# Patient Record
Sex: FEMALE | Race: WHITE | Employment: FULL TIME | URBAN - METROPOLITAN AREA
[De-identification: names, ages, dates, MRNs, and addresses within clinical notes are randomized per-mention and may not be internally consistent; named-entity substitution may affect disease eponyms.]

---

## 2017-02-22 ENCOUNTER — OFFICE VISIT (OUTPATIENT)
Dept: DERMATOLOGY | Facility: CLINIC | Age: 32
End: 2017-02-22
Payer: COMMERCIAL

## 2017-02-22 VITALS — DIASTOLIC BLOOD PRESSURE: 55 MMHG | OXYGEN SATURATION: 98 % | SYSTOLIC BLOOD PRESSURE: 110 MMHG | HEART RATE: 78 BPM

## 2017-02-22 DIAGNOSIS — L40.8 OTHER PSORIASIS: ICD-10-CM

## 2017-02-22 DIAGNOSIS — L40.9 PSORIASIS: Primary | ICD-10-CM

## 2017-02-22 PROCEDURE — 99244 OFF/OP CNSLTJ NEW/EST MOD 40: CPT | Performed by: DERMATOLOGY

## 2017-02-22 RX ORDER — CLOBETASOL PROPIONATE 0.5 MG/G
CREAM TOPICAL
Qty: 120 G | Refills: 3 | Status: SHIPPED | OUTPATIENT
Start: 2017-02-22 | End: 2020-04-07

## 2017-02-22 RX ORDER — CALCIPOTRIENE 0.05 MG/ML
SOLUTION TOPICAL
Qty: 60 ML | Refills: 3 | Status: SHIPPED | OUTPATIENT
Start: 2017-02-22 | End: 2017-12-07

## 2017-02-22 RX ORDER — CALCIPOTRIENE 50 UG/G
CREAM TOPICAL AT BEDTIME
Qty: 100 G | Refills: 3 | Status: SHIPPED | OUTPATIENT
Start: 2017-02-22 | End: 2019-12-03

## 2017-02-22 NOTE — MR AVS SNAPSHOT
"              After Visit Summary   2/22/2017    Rebeca Sow    MRN: [id number]           Patient Information     Date Of Birth          1985        Visit Information        Provider Department      2/22/2017 1:00 PM Bo Lima MD [facility]        Today's Diagnoses     Psoriasis    -  1    Other psoriasis           Follow-ups after your visit        Your next 10 appointments already scheduled     Mar 07, 2017  2:00 PM CST   PHYSICAL with Mario Saucedo MD   [facility] ([facility])    [address] [place] [address] [phone]              Who to contact     If you have questions or need follow up information about today's clinic visit or your schedule please contact [facility] directly at [phone].  Normal or non-critical lab and imaging results will be communicated to you by MyChart, letter or phone within 4 business days after the clinic has received the results. If you do not hear from us within 7 days, please contact the clinic through MyChart or phone. If you have a critical or abnormal lab result, we will notify you by phone as soon as possible.  Submit refill requests through [employer] or call your pharmacy and they will forward the refill request to us. Please allow 3 business days for your refill to be completed.          Additional Information About Your Visit        MyChart Information     [employer] lets you send messages to your doctor, view your test results, renew your prescriptions, schedule appointments and more. To sign up, go to [email or web address] . Click on \"Log in\" on the left side of the screen, which will take you to the Welcome page. Then click on \"Sign up Now\" on the right side of the page.     You will be asked to enter the access code listed below, as well as some personal information. Please follow the directions to create your username and password.     Your access code is: " [email or web address]  Expires: 2017  1:18 PM     Your access code will  in 90 days. If you need help or a new code, please call your [place] clinic or [phone].        Care EveryWhere ID     This is your Care EveryWhere ID. This could be used by other organizations to access your [place] medical records  [id number]        Your Vitals Were     Pulse Pulse Oximetry                78 98%           Blood Pressure from Last 3 Encounters:   17 110/55   16 111/82   14 126/88    Weight from Last 3 Encounters:   16 65 kg (143 lb 6.4 oz)   14 71.2 kg (157 lb)   10/22/13 74.4 kg (164 lb)              Today, you had the following     No orders found for display         Today's Medication Changes          These changes are accurate as of: 17  1:21 PM.  If you have any questions, ask your nurse or doctor.               Start taking these medicines.        Dose/Directions    clobetasol 0.05 % cream   Commonly known as:  TEMOVATE   Used for:  Psoriasis, Other psoriasis        Apply sparingly to affected area daily as needed.  Do not apply to face.   Quantity:  120 g   Refills:  3         These medicines have changed or have updated prescriptions.        Dose/Directions    * DOVONEX 0.005 % Soln solution   This may have changed:  Another medication with the same name was added. Make sure you understand how and when to take each.   Used for:  Other psoriasis   Generic drug:  calcipotriene        apply prn   Quantity:  1 large bottle   Refills:  1 year       * calcipotriene 0.005 % cream   Commonly known as:  DOVONOX   This may have changed:  You were already taking a medication with the same name, and this prescription was added. Make sure you understand how and when to take each.   Used for:  Psoriasis, Other psoriasis        Apply topically At Bedtime   Quantity:  100 g   Refills:  3       * calcipotriene 0.005 % Soln solution   Commonly known as:  DOVONOX   This may have changed:  You  were already taking a medication with the same name, and this prescription was added. Make sure you understand how and when to take each.   Used for:  Psoriasis, Other psoriasis        Apply to scalp at bedtime   Quantity:  60 mL   Refills:  3       * Notice:  This list has 3 medication(s) that are the same as other medications prescribed for you. Read the directions carefully, and ask your doctor or other care provider to review them with you.         Where to get your medicines      These medications were sent to [facility] [address] [address]     Phone:  [phone]     calcipotriene 0.005 % cream    calcipotriene 0.005 % Soln solution    clobetasol 0.05 % cream                Primary Care Provider Office Phone # Fax #    Harper Howard [phone]       [facility] [address] [address]        Thank you!     Thank you for choosing [facility]  for your care. Our goal is always to provide you with excellent care. Hearing back from our patients is one way we can continue to improve our services. Please take a few minutes to complete the written survey that you may receive in the mail after your visit with us. Thank you!             Your Updated Medication List - Protect others around you: Learn how to safely use, store and throw away your medicines at www.disposemymeds.org.          This list is accurate as of: 2/22/17  1:21 PM.  Always use your most recent med list.                   Brand Name Dispense Instructions for use    betamethasone (augmented) 0.05 % lotion    DIPROLENE    60 mL    Apply topically daily       clobetasol 0.05 % cream    TEMOVATE    120 g    Apply sparingly to affected area daily as needed.  Do not apply to face.       * DOVONEX 0.005 % Soln solution   Generic drug:  calcipotriene     1 large bottle    apply prn       * calcipotriene 0.005 % cream     DOVONOX    100 g    Apply topically At Bedtime       * calcipotriene 0.005 % Soln solution    DOVONOX    60 mL    Apply to scalp at bedtime       * Notice:  This list has 3 medication(s) that are the same as other medications prescribed for you. Read the directions carefully, and ask your doctor or other care provider to review them with you.

## 2017-02-22 NOTE — NURSING NOTE
"Initial /55  Pulse 78  SpO2 98% Estimated body mass index is 27.54 kg/(m^2) as calculated from the following:    Height as of 3/31/16: 1.537 m (5' 0.5\").    Weight as of 3/31/16: 65 kg (143 lb 6.4 oz). .      "

## 2017-03-07 ENCOUNTER — OFFICE VISIT (OUTPATIENT)
Dept: FAMILY MEDICINE | Facility: CLINIC | Age: 32
End: 2017-03-07
Payer: COMMERCIAL

## 2017-03-07 VITALS
SYSTOLIC BLOOD PRESSURE: 114 MMHG | HEIGHT: 61 IN | TEMPERATURE: 97.2 F | HEART RATE: 85 BPM | OXYGEN SATURATION: 98 % | BODY MASS INDEX: 30.85 KG/M2 | WEIGHT: 163.4 LBS | DIASTOLIC BLOOD PRESSURE: 68 MMHG

## 2017-03-07 DIAGNOSIS — D06.9 SEVERE DYSPLASIA OF CERVIX (CIN III): ICD-10-CM

## 2017-03-07 DIAGNOSIS — Z01.419 WELL WOMAN EXAM WITH ROUTINE GYNECOLOGICAL EXAM: Primary | ICD-10-CM

## 2017-03-07 DIAGNOSIS — Z23 ENCOUNTER FOR IMMUNIZATION: ICD-10-CM

## 2017-03-07 LAB
CHOLEST SERPL-MCNC: 193 MG/DL
GLUCOSE SERPL-MCNC: 64 MG/DL (ref 70–99)
HDLC SERPL-MCNC: 52 MG/DL
LDLC SERPL CALC-MCNC: 123 MG/DL
NONHDLC SERPL-MCNC: 141 MG/DL
TRIGL SERPL-MCNC: 91 MG/DL

## 2017-03-07 PROCEDURE — 90471 IMMUNIZATION ADMIN: CPT | Performed by: FAMILY MEDICINE

## 2017-03-07 PROCEDURE — 80061 LIPID PANEL: CPT | Performed by: FAMILY MEDICINE

## 2017-03-07 PROCEDURE — 82947 ASSAY GLUCOSE BLOOD QUANT: CPT | Performed by: FAMILY MEDICINE

## 2017-03-07 PROCEDURE — 87624 HPV HI-RISK TYP POOLED RSLT: CPT | Performed by: FAMILY MEDICINE

## 2017-03-07 PROCEDURE — 99395 PREV VISIT EST AGE 18-39: CPT | Performed by: FAMILY MEDICINE

## 2017-03-07 PROCEDURE — 36415 COLL VENOUS BLD VENIPUNCTURE: CPT | Performed by: FAMILY MEDICINE

## 2017-03-07 PROCEDURE — 90715 TDAP VACCINE 7 YRS/> IM: CPT | Performed by: FAMILY MEDICINE

## 2017-03-07 PROCEDURE — G0145 SCR C/V CYTO,THINLAYER,RESCR: HCPCS | Performed by: FAMILY MEDICINE

## 2017-03-07 NOTE — PROGRESS NOTES
SUBJECTIVE:     CC: Rebeca Sow is an 32 year old woman who presents for preventive health visit.     Healthy Habits:    Do you get at least three servings of calcium containing foods daily (dairy, green leafy vegetables, etc.)? yes    Amount of exercise or daily activities, outside of work: 1 day per week    Problems taking medications regularly No    Medication side effects: No    Have you had an eye exam in the past two years? yes    Do you see a dentist twice per year? No, once per year    Do you have sleep apnea, excessive snoring or daytime drowsiness?no        PROBLEMS TO ADD ON...none    Today's PHQ-2 Score:   PHQ-2 ( 1999 [employer]) 3/7/2017 3/31/2016   Q1: Little interest or pleasure in doing things 0 0   Q2: Feeling down, depressed or hopeless 0 0   PHQ-2 Score 0 0       Abuse: Current or Past(Physical, Sexual or Emotional)- No  Do you feel safe in your environment - Yes    Social History   Substance Use Topics     Smoking status: Never Smoker     Smokeless tobacco: Not on file     Alcohol use Yes      Comment: occ.     The patient does not drink >3 drinks per day nor >7 drinks per week.    No results for input(s): CHOL, HDL, LDL, TRIG, CHOLHDLRATIO, NHDL in the last 12622 hours.    Reviewed orders with patient.  Reviewed health maintenance and updated orders accordingly - Yes    Mammo Decision Support:  Mammogram not appropriate for this patient based on age.    Pertinent mammograms are reviewed under the imaging tab.  History of abnormal Pap smear: YES - BHARATI 2/3 on biopsy in 2009 with LEEP- PAP/HPV co-testing at 12, 24 months.  If two negative results repeat co-testing in 3 years, if negative then routine screening.  Normal in 2012    Reviewed and updated as needed this visit by clinical staff  Tobacco  Allergies  Med Hx  Surg Hx  Fam Hx  Soc Hx        Reviewed and updated as needed this visit by Provider  Allergies  Meds  Problems         ROS:  C: NEGATIVE for fever, chills, change in  "weight  I: NEGATIVE for worrisome rashes, moles or lesions  E: NEGATIVE for vision changes or irritation  ENT: NEGATIVE for ear, mouth and throat problems  R: NEGATIVE for significant cough or SOB  B: NEGATIVE for masses, tenderness or discharge  CV: NEGATIVE for chest pain, palpitations or peripheral edema  GI: NEGATIVE for nausea, abdominal pain, heartburn, or change in bowel habits  : NEGATIVE for unusual urinary or vaginal symptoms. Periods are regular.  M: NEGATIVE for significant arthralgias or myalgia  N: NEGATIVE for weakness, dizziness or paresthesias  P: NEGATIVE for changes in mood or affect    Problem list, Medication list, Allergies, and Medical/Social/Surgical histories reviewed in [facility] and updated as appropriate.  OBJECTIVE:     /68  Pulse 85  Temp 97.2  F (36.2  C) (Tympanic)  Ht 5' 0.5\" (1.537 m)  Wt 163 lb 6.4 oz (74.1 kg)  LMP 02/17/2017  SpO2 98%  BMI 31.39 kg/m2  EXAM:  GENERAL: healthy, alert and no distress  EYES: Eyes grossly normal to inspection, PERRL and conjunctivae and sclerae normal  HENT: ear canals and TM's normal, nose and mouth without ulcers or lesions  NECK: no adenopathy, no asymmetry, masses, or scars and thyroid normal to palpation  RESP: lungs clear to auscultation - no rales, rhonchi or wheezes  BREAST: normal without masses, tenderness or nipple discharge and no palpable axillary masses or adenopathy  CV: regular rate and rhythm, normal S1 S2, no S3 or S4, no murmur, click or rub, no peripheral edema and peripheral pulses strong  ABDOMEN: soft, nontender, no hepatosplenomegaly, no masses and bowel sounds normal   (female): normal female external genitalia, normal urethral meatus, vaginal mucosa pink, moist, well rugated, and normal cervix/adnexa/uterus without masses or discharge  MS: no gross musculoskeletal defects noted, no edema  SKIN: lower and upper legs pink colored psoriasis lesions, no suspicious lesions or rashes  NEURO: Normal strength and tone, " "mentation intact and speech normal  PSYCH: mentation appears normal, affect normal/bright    ASSESSMENT/PLAN:     Rebeca was seen today for physical and imm/inj.    Diagnoses and all orders for this visit:    Well woman exam with routine gynecological exam  -to lab for cholesterol and glucose    Severe dysplasia of cervix (BHARATI III): in 2009 with LEEP  -     Pap imaged thin layer screen with HPV - recommended age 30 - 65 years (select HPV order below)        COUNSELING:   Reviewed preventive health counseling, as reflected in patient instructions       Regular exercise       Healthy diet/nutrition       Vision screening         reports that she has never smoked. She does not have any smokeless tobacco history on file.    Estimated body mass index is 31.39 kg/(m^2) as calculated from the following:    Height as of this encounter: 5' 0.5\" (1.537 m).    Weight as of this encounter: 163 lb 6.4 oz (74.1 kg).   Weight management plan: Discussed healthy diet and exercise guidelines and patient will follow up in 12 months in clinic to re-evaluate.    Counseling Resources:  ATP IV Guidelines  Pooled Cohorts Equation Calculator  Breast Cancer Risk Calculator  FRAX Risk Assessment  ICSI Preventive Guidelines  Dietary Guidelines for Americans, 2010  USDA's MyPlate  ASA Prophylaxis  Lung CA Screening    Mario Saucedo MD  [facility]  "

## 2017-03-07 NOTE — NURSING NOTE
"Chief Complaint   Patient presents with     Physical     NOT fasting       Initial /68  Pulse 85  Temp 97.2  F (36.2  C) (Tympanic)  Ht 5' 0.5\" (1.537 m)  Wt 163 lb 6.4 oz (74.1 kg)  LMP 03/02/2017 (Exact Date)  SpO2 98%  BMI 31.39 kg/m2 Estimated body mass index is 31.39 kg/(m^2) as calculated from the following:    Height as of this encounter: 5' 0.5\" (1.537 m).    Weight as of this encounter: 163 lb 6.4 oz (74.1 kg).  Medication Reconciliation: complete  "

## 2017-03-07 NOTE — MR AVS SNAPSHOT
After Visit Summary   3/7/2017    Rebeca Sow    MRN: [id number]           Patient Information     Date Of Birth          1985        Visit Information        Provider Department      3/7/2017 2:00 PM Mario Saucedo MD [facility]        Today's Diagnoses     Well woman exam with routine gynecological exam    -  1    Severe dysplasia of cervix (BHARATI III)          Care Instructions    To lab for blood work    Start prenatal when start trying for folic acid and iron        Preventive Health Recommendations  Female Ages 26 - 39  Yearly exam:   See your health care provider every year in order to    Review health changes.     Discuss preventive care.      Review your medicines if you your doctor has prescribed any.    Until age 30: Get a Pap test every three years (more often if you have had an abnormal result).    After age 30: Talk to your doctor about whether you should have a Pap test every 3 years or have a Pap test with HPV screening every 5 years.   You do not need a Pap test if your uterus was removed (hysterectomy) and you have not had cancer.  You should be tested each year for STDs (sexually transmitted diseases), if you're at risk.   Talk to your provider about how often to have your cholesterol checked.  If you are at risk for diabetes, you should have a diabetes test (fasting glucose).  Shots: Get a flu shot each year. Get a tetanus shot every 10 years.   Nutrition:     Eat at least 5 servings of fruits and vegetables each day.    Eat whole-grain bread, whole-wheat pasta and brown rice instead of white grains and rice.    Talk to your provider about Calcium and Vitamin D.     Lifestyle    Exercise at least 150 minutes a week (30 minutes a day, 5 days of the week). This will help you control your weight and prevent disease.    Limit alcohol to one drink per day.    No smoking.     Wear sunscreen to prevent skin cancer.    See your dentist every six months for an exam  "and cleaning.            Thank you for choosing [facility].  You may be receiving a survey in the mail from Mimi Webb regarding your visit today.  Please take a few minutes to complete and return the survey to let us know how we are doing.      If you have questions or concerns, please contact us via "[employer]" or you can contact your care team at [phone].    Our Clinic hours are:  Monday 6:40 am  to 7:00 pm  Tuesday -Friday 6:40 am to 5:00 pm    The [place] outpatient lab hours are:  Monday - Friday 6:10 am to 4:45 pm  Saturdays 7:00 am to 11:00 am  Appointments are required, call [phone]    If you have clinical questions after hours or would like to schedule an appointment,  call the clinic at [phone].        Follow-ups after your visit        Who to contact     If you have questions or need follow up information about today's clinic visit or your schedule please contact [facility] directly at [phone].  Normal or non-critical lab and imaging results will be communicated to you by CIRQYhart, letter or phone within 4 business days after the clinic has received the results. If you do not hear from us within 7 days, please contact the clinic through [employer] or phone. If you have a critical or abnormal lab result, we will notify you by phone as soon as possible.  Submit refill requests through "[employer]" or call your pharmacy and they will forward the refill request to us. Please allow 3 business days for your refill to be completed.          Additional Information About Your Visit        [email or web address] Information     "[employer]" lets you send messages to your doctor, view your test results, renew your prescriptions, schedule appointments and more. To sign up, go to [email or web address]" . Click on \"Log in\" on the left side of the screen, which will take you to the Welcome page. Then click on \"Sign up Now\" on the right side of the page.     You will be asked to enter the access code listed " "below, as well as some personal information. Please follow the directions to create your username and password.     Your access code is: [email or web address]  Expires: 2017  1:18 PM     Your access code will  in 90 days. If you need help or a new code, please call your [facility] or [phone].        Care EveryWhere ID     This is your Care EveryWhere ID. This could be used by other organizations to access your [place] medical records  [id number]        Your Vitals Were     Pulse Temperature Height Last Period Pulse Oximetry BMI (Body Mass Index)    85 97.2  F (36.2  C) (Tympanic) 5' 0.5\" (1.537 m) 2017 98% 31.39 kg/m2       Blood Pressure from Last 3 Encounters:   17 114/68   17 110/55   16 111/82    Weight from Last 3 Encounters:   17 163 lb 6.4 oz (74.1 kg)   16 143 lb 6.4 oz (65 kg)   14 157 lb (71.2 kg)              We Performed the Following     Glucose     Lipid Profile with reflex to direct LDL     Pap imaged thin layer screen with HPV - recommended age 30 - 65 years (select HPV order below)        Primary Care Provider Office Phone # Fax #    Harper Howard [phone]       [facility] [address] [address]        Thank you!     Thank you for choosing [facility]  for your care. Our goal is always to provide you with excellent care. Hearing back from our patients is one way we can continue to improve our services. Please take a few minutes to complete the written survey that you may receive in the mail after your visit with us. Thank you!             Your Updated Medication List - Protect others around you: Learn how to safely use, store and throw away your medicines at www.disposemymeds.org.          This list is accurate as of: 3/7/17  2:36 PM.  Always use your most recent med list.                   Brand Name Dispense Instructions for use    betamethasone (augmented) 0.05 % lotion    " DIPROLENE    60 mL    Apply topically daily       clobetasol 0.05 % cream    TEMOVATE    120 g    Apply sparingly to affected area daily as needed.  Do not apply to face.       * DOVONEX 0.005 % Soln solution   Generic drug:  calcipotriene     1 large bottle    apply prn       * calcipotriene 0.005 % cream    DOVONOX    100 g    Apply topically At Bedtime       * calcipotriene 0.005 % Soln solution    DOVONOX    60 mL    Apply to scalp at bedtime       * Notice:  This list has 3 medication(s) that are the same as other medications prescribed for you. Read the directions carefully, and ask your doctor or other care provider to review them with you.

## 2017-03-07 NOTE — LETTER
[facility]  [address] [address]  [phone]      March 15, 2017    Rebeca Sow  [address] [place] [id number]    Dear Rebeca,  We are happy to inform you that your PAP smear result from 3/7/17 is normal.  We are now able to do a follow up test on PAP smears. The DNA test is for HPV (Human Papilloma Virus). Cervical cancer is closely linked with certain types of HPV. Your result showed no evidence of high risk HPV.  Therefore we recommend you return in 1 year for your next pap smear and HPV test.  You will still need to return to the clinic every year for an annual exam and other preventive tests.  Please contact the clinic with any questions.  Sincerely,  Mario Saucedo MD/jacob

## 2017-03-07 NOTE — PATIENT INSTRUCTIONS
To lab for blood work    Start prenatal when start trying for folic acid and iron        Preventive Health Recommendations  Female Ages 26 - 39  Yearly exam:   See your health care provider every year in order to    Review health changes.     Discuss preventive care.      Review your medicines if you your doctor has prescribed any.    Until age 30: Get a Pap test every three years (more often if you have had an abnormal result).    After age 30: Talk to your doctor about whether you should have a Pap test every 3 years or have a Pap test with HPV screening every 5 years.   You do not need a Pap test if your uterus was removed (hysterectomy) and you have not had cancer.  You should be tested each year for STDs (sexually transmitted diseases), if you're at risk.   Talk to your provider about how often to have your cholesterol checked.  If you are at risk for diabetes, you should have a diabetes test (fasting glucose).  Shots: Get a flu shot each year. Get a tetanus shot every 10 years.   Nutrition:     Eat at least 5 servings of fruits and vegetables each day.    Eat whole-grain bread, whole-wheat pasta and brown rice instead of white grains and rice.    Talk to your provider about Calcium and Vitamin D.     Lifestyle    Exercise at least 150 minutes a week (30 minutes a day, 5 days of the week). This will help you control your weight and prevent disease.    Limit alcohol to one drink per day.    No smoking.     Wear sunscreen to prevent skin cancer.    See your dentist every six months for an exam and cleaning.            Thank you for choosing [facility].  You may be receiving a survey in the mail from [employer] regarding your visit today.  Please take a few minutes to complete and return the survey to let us know how we are doing.      If you have questions or concerns, please contact us via [employer]ite or you can contact your care team at [phone].    Our Clinic hours are:  Monday 6:40 am  to 7:00  pm  Tuesday -Friday 6:40 am to 5:00 pm    The [place] outpatient lab hours are:  Monday - Friday 6:10 am to 4:45 pm  Saturdays 7:00 am to 11:00 am  Appointments are required, call [phone]    If you have clinical questions after hours or would like to schedule an appointment,  call the clinic at [phone].

## 2017-03-09 LAB
COPATH REPORT: NORMAL
PAP: NORMAL

## 2017-03-13 LAB
FINAL DIAGNOSIS: NORMAL
HPV HR 12 DNA CVX QL NAA+PROBE: NEGATIVE
HPV16 DNA SPEC QL NAA+PROBE: NEGATIVE
HPV18 DNA SPEC QL NAA+PROBE: NEGATIVE
SPECIMEN DESCRIPTION: NORMAL

## 2017-04-04 ENCOUNTER — OFFICE VISIT (OUTPATIENT)
Dept: FAMILY MEDICINE | Facility: CLINIC | Age: 32
End: 2017-04-04
Payer: COMMERCIAL

## 2017-04-04 VITALS
HEIGHT: 61 IN | HEART RATE: 109 BPM | SYSTOLIC BLOOD PRESSURE: 122 MMHG | WEIGHT: 166.4 LBS | DIASTOLIC BLOOD PRESSURE: 83 MMHG | TEMPERATURE: 99.7 F | BODY MASS INDEX: 31.42 KG/M2 | OXYGEN SATURATION: 95 %

## 2017-04-04 DIAGNOSIS — G43.011 INTRACTABLE MIGRAINE WITHOUT AURA AND WITH STATUS MIGRAINOSUS: Primary | ICD-10-CM

## 2017-04-04 PROCEDURE — 99213 OFFICE O/P EST LOW 20 MIN: CPT | Performed by: INTERNAL MEDICINE

## 2017-04-04 RX ORDER — RIZATRIPTAN BENZOATE 5 MG/1
5-10 TABLET ORAL
Qty: 18 TABLET | Refills: 3 | Status: SHIPPED | OUTPATIENT
Start: 2017-04-04 | End: 2019-11-18

## 2017-04-04 NOTE — MR AVS SNAPSHOT
"              After Visit Summary   4/4/2017    Rebcea Sow    MRN: [id number]           Patient Information     Date Of Birth          1985        Visit Information        Provider Department      4/4/2017 3:20 PM Darin Drake MD [facility]        Today's Diagnoses     Intractable migraine without aura and with status migrainosus    -  1      Care Instructions    Your headache has mixed features- it's not entirely clear what type of headache it is.  It does have some features of migraine, so we will try a migraine medicine called rizatriptan.  If headache is still not improved after a week, please let us know.  If you develop worsening neck pain, fevers, or confusion, please go to the ER.          Follow-ups after your visit        Who to contact     If you have questions or need follow up information about today's clinic visit or your schedule please contact [facility] directly at [phone].  Normal or non-critical lab and imaging results will be communicated to you by MyChart, letter or phone within 4 business days after the clinic has received the results. If you do not hear from us within 7 days, please contact the clinic through [employer] or phone. If you have a critical or abnormal lab result, we will notify you by phone as soon as possible.  Submit refill requests through [employer] or call your pharmacy and they will forward the refill request to us. Please allow 3 business days for your refill to be completed.          Additional Information About Your Visit        MyChart Information     [employer] lets you send messages to your doctor, view your test results, renew your prescriptions, schedule appointments and more. To sign up, go to [email or web address] . Click on \"Log in\" on the left side of the screen, which will take you to the Welcome page. Then click on \"Sign up Now\" on the right side of the page.     You will be asked to enter the access code listed below, as " "well as some personal information. Please follow the directions to create your username and password.     Your access code is: [email or web address]  Expires: 2017  2:18 PM     Your access code will  in 90 days. If you need help or a new code, please call your [facility] clinic or [phone].        Care EveryWhere ID     This is your Care EveryWhere ID. This could be used by other organizations to access your [facility] medical records  [id number]        Your Vitals Were     Pulse Temperature Height Last Period Pulse Oximetry BMI (Body Mass Index)    109 99.7  F (37.6  C) (Tympanic) 5' 0.5\" (1.537 m) 2017 95% 31.96 kg/m2       Blood Pressure from Last 3 Encounters:   17 122/83   17 114/68   17 110/55    Weight from Last 3 Encounters:   17 166 lb 6.4 oz (75.5 kg)   17 163 lb 6.4 oz (74.1 kg)   16 143 lb 6.4 oz (65 kg)              Today, you had the following     No orders found for display         Today's Medication Changes          These changes are accurate as of: 17  4:04 PM.  If you have any questions, ask your nurse or doctor.               Start taking these medicines.        Dose/Directions    rizatriptan 5 MG tablet   Commonly known as:  MAXALT   Used for:  Intractable migraine without aura and with status migrainosus   Started by:  Darin Drake MD        Dose:  5-10 mg   Take 1-2 tablets (5-10 mg) by mouth at onset of headache for migraine May repeat in 2 hours. Max 6 tablets/24 hours.   Quantity:  18 tablet   Refills:  3            Where to get your medicines      These medications were sent to [facility] [place], [place] - [address]  [address] [address]     Phone:  [phone]     rizatriptan 5 MG tablet                Primary Care Provider Office Phone # Fax #    Harper Howard [phone]       [facility] [address] [address]        Thank you!     Thank you for " choosing [facility]  for your care. Our goal is always to provide you with excellent care. Hearing back from our patients is one way we can continue to improve our services. Please take a few minutes to complete the written survey that you may receive in the mail after your visit with us. Thank you!             Your Updated Medication List - Protect others around you: Learn how to safely use, store and throw away your medicines at www.disposemymeds.org.          This list is accurate as of: 4/4/17  4:04 PM.  Always use your most recent med list.                   Brand Name Dispense Instructions for use    betamethasone (augmented) 0.05 % lotion    DIPROLENE    60 mL    Apply topically daily       clobetasol 0.05 % cream    TEMOVATE    120 g    Apply sparingly to affected area daily as needed.  Do not apply to face.       * DOVONEX 0.005 % Soln solution   Generic drug:  calcipotriene     1 large bottle    apply prn       * calcipotriene 0.005 % cream    DOVONOX    100 g    Apply topically At Bedtime       * calcipotriene 0.005 % Soln solution    DOVONOX    60 mL    Apply to scalp at bedtime       rizatriptan 5 MG tablet    MAXALT    18 tablet    Take 1-2 tablets (5-10 mg) by mouth at onset of headache for migraine May repeat in 2 hours. Max 6 tablets/24 hours.       * Notice:  This list has 3 medication(s) that are the same as other medications prescribed for you. Read the directions carefully, and ask your doctor or other care provider to review them with you.

## 2017-04-04 NOTE — PATIENT INSTRUCTIONS
Your headache has mixed features- it's not entirely clear what type of headache it is.  It does have some features of migraine, so we will try a migraine medicine called rizatriptan.  If headache is still not improved after a week, please let us know.  If you develop worsening neck pain, fevers, or confusion, please go to the ER.

## 2017-04-04 NOTE — PROGRESS NOTES
SUBJECTIVE:                                                    Rebeca Sow is a 32 year old female who presents to clinic today for the following health issues:  Chief Complaint   Patient presents with     Headache     x 6 days, w/ some dizziness, no history of migraine      Headache     Onset: x 6 days     Description:   Location: bilateral in the frontal area, and the back of the head/neck    Character: squeezing pain, throbbing pain, more noticeable when standing   Frequency:  All day/ every day - constant since Thursday.   Duration:  All day     Intensity: moderate    Progression of Symptoms:  improving and Saturday was the worse day w symptoms and patient reports felt hot/cold and felt like she was going to vomit    Patient had indirect exposure to meningitis (coworker's son).      Accompanying Signs & Symptoms:  Stiff neck: YES  Neck or upper back pain: YES, some neck pain but this is mild  Fever: YES- slightly hot/cold at home   Sinus pressure: no   Nausea or vomiting: YES- nausea, no vomiting   Dizziness: YES- while standing  Numbness: no   Weakness: no   Visual changes: YES- a little of blurred vision, not able to focus w/ computer work.  Last vision check was a couple of months ago.  Patient reports she wears glasses and they broke a couple of months ago and has not filled prescription yet.   Ears feel a bit plugged, no pain   History:   Head trauma: no   Family history of migraines: yes, mother gets migraine, but knows her triggers.   Previous tests for headaches: no   Neurologist evaluations: no   Able to do daily activities: no, did not go to work today.   Wake with a headaches: no, no headache for about 15 - 20 minutes in the morning, but once awake and up, headache comes right back.   Do headaches wake you up: no   Daily pain medication use: no   Work/school stressors/changes: no     Precipitating factors:   Does light make it worse: YES  Does sound make it worse: YES, phones ringing, papers  "rustling     Alleviating factors:  Does sleep help: YES         Therapies Tried and outcome: Ibuprofen (Advil, Motrin) on Sat. And Sunday- somewhat helpful. 12 hour sinus medication and Flonase     Rebeca does not have any prior history of headache, but 5 days ago started to develop a headache at work that gradually worsened over the next couple of days.  Associated symptoms as above.     Problem list and histories reviewed & adjusted, as indicated.  Additional history: as documented    Current Outpatient Prescriptions   Medication Sig Dispense Refill     rizatriptan (MAXALT) 5 MG tablet Take 1-2 tablets (5-10 mg) by mouth at onset of headache for migraine May repeat in 2 hours. Max 6 tablets/24 hours. 18 tablet 3     clobetasol (TEMOVATE) 0.05 % cream Apply sparingly to affected area daily as needed.  Do not apply to face. 120 g 3     calcipotriene (DOVONOX) 0.005 % cream Apply topically At Bedtime 100 g 3     calcipotriene (DOVONOX) 0.005 % SOLN solution Apply to scalp at bedtime 60 mL 3     betamethasone, augmented, (DIPROLENE) 0.05 % lotion Apply topically daily 60 mL 6     DOVONEX 0.005 % EX SOLN apply prn 1 large bottle 1 year     No Known Allergies    Reviewed and updated as needed this visit by clinical staff  Tobacco  Allergies  Med Hx  Surg Hx  Fam Hx  Soc Hx      Reviewed and updated as needed this visit by Provider         ROS:  Constitutional, HEENT, neuro systems are negative, except as otherwise noted.    OBJECTIVE:                                                    /83 (BP Location: Right arm, Patient Position: Chair, Cuff Size: Adult Regular)  Pulse 109  Temp 99.7  F (37.6  C) (Tympanic)  Ht 5' 0.5\" (1.537 m)  Wt 166 lb 6.4 oz (75.5 kg)  LMP 02/17/2017  SpO2 95%  BMI 31.96 kg/m2  Body mass index is 31.96 kg/(m^2).    GENERAL: healthy, alert and no distress  RESP: lungs clear to auscultation - no rales, rhonchi or wheezes  CV: regular rate and rhythm, normal S1 S2, no S3 or S4, no " murmur, click or rub, normal temporal pulses  MSK: Mild neck pain at base of skull bilaterally, pain not significantly increased with neck flexion  NEURO: Cranial nerves intact, normal strength and tone, sensory exam grossly normal, mentation intact, DTR's normal and symmetric at patellas, gait normal including heel/toe/tandem walking and Romberg normal, normal finger to nose and heel to shin tests, negative Alok-Hallpike      Diagnostic Test Results:  none      ASSESSMENT/PLAN:                                                        1. Headache- mixed features between migraine and tension    Rebeca has some features of both tension headache and migraine.  Her coworker's son had meningitis recently, but her neck pain is mild and she does not appear sick, so I think likelihood of meningitis is quite low- but advised her to present to ER if she develops worsening neck pain, fevers, confusion.  She wonders about sinus headache, but has not had sinus pain, so I think this is unlikely.  She can continue Flonase to see if that is helpful if she desires.  She has not used a lot of ibuprofen, so I do not suspect she has rebound headache.  Neurologic exam is normal, so I did not feel there was indication for imaging at this point.  We will try some rizatriptan since she does have some migraine features to see if that is helpful.  Follow-up in a week if not improved.      - rizatriptan (MAXALT) 5 MG tablet; Take 1-2 tablets (5-10 mg) by mouth at onset of headache for migraine May repeat in 2 hours. Max 6 tablets/24 hours.  Dispense: 18 tablet; Refill: 3      Darin Drake MD  [facility]

## 2017-04-04 NOTE — NURSING NOTE
"Chief Complaint   Patient presents with     Headache     x 6 days, w/ some dizziness, no history of migraine        Initial /83 (BP Location: Right arm, Patient Position: Chair, Cuff Size: Adult Regular)  Pulse 109  Temp 99.7  F (37.6  C) (Tympanic)  Ht 5' 0.5\" (1.537 m)  Wt 166 lb 6.4 oz (75.5 kg)  LMP 02/17/2017  SpO2 95%  BMI 31.96 kg/m2 Estimated body mass index is 31.96 kg/(m^2) as calculated from the following:    Height as of this encounter: 5' 0.5\" (1.537 m).    Weight as of this encounter: 166 lb 6.4 oz (75.5 kg).  Medication Reconciliation: complete   Nata MATHIS CMA (AAMA)    "

## 2017-05-11 PROBLEM — G43.011 INTRACTABLE MIGRAINE WITHOUT AURA AND WITH STATUS MIGRAINOSUS: Status: ACTIVE | Noted: 2017-05-11

## 2017-12-07 ENCOUNTER — TELEPHONE (OUTPATIENT)
Dept: FAMILY MEDICINE | Facility: CLINIC | Age: 32
End: 2017-12-07

## 2017-12-07 ENCOUNTER — OFFICE VISIT (OUTPATIENT)
Dept: FAMILY MEDICINE | Facility: CLINIC | Age: 32
End: 2017-12-07
Payer: COMMERCIAL

## 2017-12-07 VITALS
HEIGHT: 61 IN | SYSTOLIC BLOOD PRESSURE: 123 MMHG | HEART RATE: 82 BPM | BODY MASS INDEX: 32.81 KG/M2 | OXYGEN SATURATION: 98 % | DIASTOLIC BLOOD PRESSURE: 81 MMHG | TEMPERATURE: 97.6 F | WEIGHT: 173.8 LBS | RESPIRATION RATE: 16 BRPM

## 2017-12-07 DIAGNOSIS — L40.9 PSORIASIS: ICD-10-CM

## 2017-12-07 DIAGNOSIS — S39.012A BACK STRAIN, INITIAL ENCOUNTER: Primary | ICD-10-CM

## 2017-12-07 PROCEDURE — 99214 OFFICE O/P EST MOD 30 MIN: CPT | Performed by: NURSE PRACTITIONER

## 2017-12-07 RX ORDER — CALCIPOTRIENE 0.05 MG/ML
SOLUTION TOPICAL
Qty: 60 ML | Refills: 3 | Status: SHIPPED | OUTPATIENT
Start: 2017-12-07 | End: 2018-10-17

## 2017-12-07 RX ORDER — CYCLOBENZAPRINE HCL 10 MG
5-10 TABLET ORAL
Qty: 30 TABLET | Refills: 1 | Status: SHIPPED | OUTPATIENT
Start: 2017-12-07 | End: 2018-10-17

## 2017-12-07 RX ORDER — CALCIPOTRIENE 0.05 MG/ML
SOLUTION TOPICAL
Qty: 60 ML | Refills: 3 | Status: SHIPPED | OUTPATIENT
Start: 2017-12-07 | End: 2017-12-07

## 2017-12-07 RX ORDER — CALCIPOTRIENE 50 UG/G
OINTMENT TOPICAL
Qty: 120 G | Refills: 3 | Status: SHIPPED | OUTPATIENT
Start: 2017-12-07 | End: 2019-05-22

## 2017-12-07 NOTE — PATIENT INSTRUCTIONS
Back Sprain or Strain    Injury to the muscles (strain) or ligaments (sprain) around the spine can be troubling. Injury may occur after a sudden forceful twisting or bending force such as in a car accident, after a simple awkward movement, or after lifting something heavy with poor body positioning. In any case, muscle spasm is often present and adds to the pain.  Thankfully, most people feel better in 1 to 2 weeks, and most of the rest in 1 to 2 months. Most people can remain active. Unless you had a forceful or traumatic physical injury such as a car accident or fall, X-rays may not be ordered for the first evaluation of a back sprain or strain. If pain continues and does not respond to medical treatment, your healthcare provider may then order X-rays and other tests.  Home care  The following guidelines will help you care for your injury at home:    When in bed, try to find a comfortable position. A firm mattress is best. Try lying flat on your back with pillows under your knees. You can also try lying on your side with your knees bent up toward your chest and a pillow between your knees.    Don't sit for long periods. Try not to take long car rides or take other trips that have you sitting for a long time. This puts more stress on the lower back than standing or walking.    During the first 24 to 72 hours after an injury or flare-up, apply an ice pack to the painful area for 20 minutes. Then remove it for 20 minutes. Do this for 60 to 90 minutes, or several times a day. This will reduce swelling and pain. Be sure to wrap the ice pack in a thin towel or plastic to protect your skin.    You can start with ice, then switch to heat. Heat from a hot shower, hot bath, or heating pad reduces pain and works well for muscle spasms. Put heat on the painful area for 20 minutes, then remove for 20 minutes. Do this for 60 to 90 minutes, or several times a day. Do not use a heating pad while sleeping. It can burn the  skin.    You can alternate the ice and heat. Talk with your healthcare provider to find out the best treatment or therapy for your back pain.    Therapeutic massage will help relax the back muscles without stretching them.    Be aware of safe lifting methods. Do not lift anything over 15 pounds until all of the pain is gone.  Medicines  Talk to your healthcare provider before using medicines, especially if you have other health problems or are taking other medicines.    You may use acetaminophen or ibuprofen to control pain, unless another pain medicine was prescribed. If you have chronic conditions like diabetes, liver or kidney disease, stomach ulcers, or gastrointestinal bleeding, or are taking blood-thinner medicines, talk with your doctor before taking any medicines.    Be careful if you are given prescription medicines, narcotics, or medicine for muscle spasm. They can cause drowsiness, and affect your coordination, reflexes, and judgment. Do not drive or operate heavy machinery when taking these types of medicines. Only take pain medicine as prescribed by your healthcare provider.  Follow-up care  Follow up with your healthcare provider, or as advised. You may need physical therapy or more tests if your symptoms get worse.  If you had X-rays your healthcare provider may be checking for any broken bones, breaks, or fractures. Bruises and sprains can sometimes hurt as much as a fracture. These injuries can take time to heal completely. If your symptoms don t improve or they get worse, talk with your healthcare provider. You may need a repeat X-ray or other tests.  Call 911  Call for emergency care if any of the following occur:    Trouble breathing    Confused    Very drowsy or trouble awakening    Fainting or loss of consciousness    Rapid or very slow heart rate    Loss of bowel or bladder control  When to seek medical advice  Call your healthcare provider right away if any of the following occur:    Pain  gets worse or spreads to your arms or legs    Weakness or numbness in one or both arms or legs    Numbness in the groin or genital area  Date Last Reviewed: 6/1/2016 2000-2017 [employer]. [address] [place] [address], [place] [address]. All rights reserved. This information is not intended as a substitute for professional medical care. Always follow your healthcare professional's instructions.

## 2017-12-07 NOTE — TELEPHONE ENCOUNTER
From pharmacy:  Calcipotriene is covered by pt's insurance but the co-pay is $207.40.  Alternate drug?

## 2017-12-07 NOTE — TELEPHONE ENCOUNTER
There is only 1 other liquid alternative, Calcitriol in oil and this is not covered by her insurance. It is expensive, similar to Calcipotriene. I can order an ointment, that would probably be cheaper, but she likes the liquid for scalp application. ALFA Jimenez CNP

## 2017-12-07 NOTE — NURSING NOTE
"Chief Complaint   Patient presents with     Back Pain     Derm Problem     Exposure to shingles. Has a spot on her right side. Denies any pain, blistering, or itching. Believes this to be psoriasis however she states that she typically only gets this on her scalp.       Initial /81 (BP Location: Right arm, Patient Position: Sitting, Cuff Size: Adult Large)  Pulse 82  Temp 97.6  F (36.4  C) (Tympanic)  Resp 16  Ht 5' 0.5\" (1.537 m)  Wt 173 lb 12.8 oz (78.8 kg)  SpO2 98%  BMI 33.38 kg/m2 Estimated body mass index is 33.38 kg/(m^2) as calculated from the following:    Height as of this encounter: 5' 0.5\" (1.537 m).    Weight as of this encounter: 173 lb 12.8 oz (78.8 kg).  Medication Reconciliation: complete  "

## 2017-12-07 NOTE — MR AVS SNAPSHOT
After Visit Summary   12/7/2017    Rebeca Sow    MRN: [id number]           Patient Information     Date Of Birth          1985        Visit Information        Provider Department      12/7/2017 11:00 AM Edith Jamison APRN [facility]        Today's Diagnoses     Back strain, initial encounter    -  1    Psoriasis        Other psoriasis          Care Instructions      Back Sprain or Strain    Injury to the muscles (strain) or ligaments (sprain) around the spine can be troubling. Injury may occur after a sudden forceful twisting or bending force such as in a car accident, after a simple awkward movement, or after lifting something heavy with poor body positioning. In any case, muscle spasm is often present and adds to the pain.  Thankfully, most people feel better in 1 to 2 weeks, and most of the rest in 1 to 2 months. Most people can remain active. Unless you had a forceful or traumatic physical injury such as a car accident or fall, X-rays may not be ordered for the first evaluation of a back sprain or strain. If pain continues and does not respond to medical treatment, your healthcare provider may then order X-rays and other tests.  Home care  The following guidelines will help you care for your injury at home:    When in bed, try to find a comfortable position. A firm mattress is best. Try lying flat on your back with pillows under your knees. You can also try lying on your side with your knees bent up toward your chest and a pillow between your knees.    Don't sit for long periods. Try not to take long car rides or take other trips that have you sitting for a long time. This puts more stress on the lower back than standing or walking.    During the first 24 to 72 hours after an injury or flare-up, apply an ice pack to the painful area for 20 minutes. Then remove it for 20 minutes. Do this for 60 to 90 minutes, or several times a day. This will reduce swelling and  pain. Be sure to wrap the ice pack in a thin towel or plastic to protect your skin.    You can start with ice, then switch to heat. Heat from a hot shower, hot bath, or heating pad reduces pain and works well for muscle spasms. Put heat on the painful area for 20 minutes, then remove for 20 minutes. Do this for 60 to 90 minutes, or several times a day. Do not use a heating pad while sleeping. It can burn the skin.    You can alternate the ice and heat. Talk with your healthcare provider to find out the best treatment or therapy for your back pain.    Therapeutic massage will help relax the back muscles without stretching them.    Be aware of safe lifting methods. Do not lift anything over 15 pounds until all of the pain is gone.  Medicines  Talk to your healthcare provider before using medicines, especially if you have other health problems or are taking other medicines.    You may use acetaminophen or ibuprofen to control pain, unless another pain medicine was prescribed. If you have chronic conditions like diabetes, liver or kidney disease, stomach ulcers, or gastrointestinal bleeding, or are taking blood-thinner medicines, talk with your doctor before taking any medicines.    Be careful if you are given prescription medicines, narcotics, or medicine for muscle spasm. They can cause drowsiness, and affect your coordination, reflexes, and judgment. Do not drive or operate heavy machinery when taking these types of medicines. Only take pain medicine as prescribed by your healthcare provider.  Follow-up care  Follow up with your healthcare provider, or as advised. You may need physical therapy or more tests if your symptoms get worse.  If you had X-rays your healthcare provider may be checking for any broken bones, breaks, or fractures. Bruises and sprains can sometimes hurt as much as a fracture. These injuries can take time to heal completely. If your symptoms don t improve or they get worse, talk with your  "healthcare provider. You may need a repeat X-ray or other tests.  Call 911  Call for emergency care if any of the following occur:    Trouble breathing    Confused    Very drowsy or trouble awakening    Fainting or loss of consciousness    Rapid or very slow heart rate    Loss of bowel or bladder control  When to seek medical advice  Call your healthcare provider right away if any of the following occur:    Pain gets worse or spreads to your arms or legs    Weakness or numbness in one or both arms or legs    Numbness in the groin or genital area  Date Last Reviewed: 6/1/2016 2000-2017 [employer]. [address] [place] [address]. All rights reserved. This information is not intended as a substitute for professional medical care. Always follow your healthcare professional's instructions.                Follow-ups after your visit        Who to contact     If you have questions or need follow up information about today's clinic visit or your schedule please contact [facility] directly at [phone].  Normal or non-critical lab and imaging results will be communicated to you by Bamateahart, letter or phone within 4 business days after the clinic has received the results. If you do not hear from us within 7 days, please contact the clinic through Bamateahart or phone. If you have a critical or abnormal lab result, we will notify you by phone as soon as possible.  Submit refill requests through Stublisher or call your pharmacy and they will forward the refill request to us. Please allow 3 business days for your refill to be completed.          Additional Information About Your Visit        Bamateahart Information     Stublisher lets you send messages to your doctor, view your test results, renew your prescriptions, schedule appointments and more. To sign up, go to [email or web address] . Click on \"Log in\" on the left side of the screen, which will take you to the Welcome page. Then click on " "\"Sign up Now\" on the right side of the page.     You will be asked to enter the access code listed below, as well as some personal information. Please follow the directions to create your username and password.     Your access code is: [email or web address]  Expires: 3/7/2018 11:38 AM     Your access code will  in 90 days. If you need help or a new code, please call your [facility] clinic or [phone].        Care EveryWhere ID     This is your Care EveryWhere ID. This could be used by other organizations to access your Central medical records  [id number]        Your Vitals Were     Pulse Temperature Respirations Height Pulse Oximetry BMI (Body Mass Index)    82 97.6  F (36.4  C) (Tympanic) 16 5' 0.5\" (1.537 m) 98% 33.38 kg/m2       Blood Pressure from Last 3 Encounters:   17 123/81   17 122/83   17 114/68    Weight from Last 3 Encounters:   17 173 lb 12.8 oz (78.8 kg)   17 166 lb 6.4 oz (75.5 kg)   17 163 lb 6.4 oz (74.1 kg)              Today, you had the following     No orders found for display         Today's Medication Changes          These changes are accurate as of: 17 11:38 AM.  If you have any questions, ask your nurse or doctor.               Start taking these medicines.        Dose/Directions    cyclobenzaprine 10 MG tablet   Commonly known as:  FLEXERIL   Used for:  Back strain, initial encounter   Started by:  Edith Jamison APRN CNP        Dose:  5-10 mg   Take 0.5-1 tablets (5-10 mg) by mouth nightly as needed for muscle spasms or other (back pain)   Quantity:  30 tablet   Refills:  1            Where to get your medicines      These medications were sent to [facility] [place] - [address]  [address], [place] [address]    Hours:  [place] bContext Phone:  [phone]     calcipotriene 0.005 % Soln solution    cyclobenzaprine 10 MG tablet                Primary Care Provider Office Phone # Lqx # "    Mario Saucedo [phone]       [address] [facility] [address]        Equal Access to Services     LAUREN OWENS : Hadii aad ku hadariel Dhillonali, waagda caloshira, curtista kagenesisda meaghan, chelsey alvarez laElenitaparis mireles. So [facility] [phone].    ATENCIÓN: Si habla español, tiene a travis disposición servicios gratuitos de asistencia lingüística. Llame al [phone].    We comply with applicable federal civil rights laws and [place] laws. We do not discriminate on the basis of race, color, national origin, age, disability, sex, sexual orientation, or gender identity.            Thank you!     Thank you for choosing [facility]  for your care. Our goal is always to provide you with excellent care. Hearing back from our patients is one way we can continue to improve our services. Please take a few minutes to complete the written survey that you may receive in the mail after your visit with us. Thank you!             Your Updated Medication List - Protect others around you: Learn how to safely use, store and throw away your medicines at www.disposemymeds.org.          This list is accurate as of: 12/7/17 11:38 AM.  Always use your most recent med list.                   Brand Name Dispense Instructions for use Diagnosis    betamethasone (augmented) 0.05 % lotion    DIPROLENE    60 mL    Apply topically daily    Other psoriasis       * calcipotriene 0.005 % cream    DOVONOX    100 g    Apply topically At Bedtime    Psoriasis, Other psoriasis       * calcipotriene 0.005 % Soln solution    DOVONOX    60 mL    Apply to scalp at bedtime    Psoriasis, Other psoriasis       clobetasol 0.05 % cream    TEMOVATE    120 g    Apply sparingly to affected area daily as needed.  Do not apply to face.    Psoriasis, Other psoriasis       cyclobenzaprine 10 MG tablet    FLEXERIL    30 tablet    Take 0.5-1 tablets (5-10 mg) by mouth nightly as needed for muscle spasms or other (back  pain)    Back strain, initial encounter       rizatriptan 5 MG tablet    MAXALT    18 tablet    Take 1-2 tablets (5-10 mg) by mouth at onset of headache for migraine May repeat in 2 hours. Max 6 tablets/24 hours.    Intractable migraine without aura and with status migrainosus       * Notice:  This list has 2 medication(s) that are the same as other medications prescribed for you. Read the directions carefully, and ask your doctor or other care provider to review them with you.

## 2017-12-07 NOTE — PROGRESS NOTES
SUBJECTIVE:   Rebeca Sow is a 32 year old female who presents to clinic today for the following health issues:    Chief Complaint   Patient presents with     Back Pain     Derm Problem     Exposure to shingles. Has a spot on her right side. Denies any pain, blistering, or itching. Believes this to be psoriasis however she states that she typically only gets this on her scalp.     Patient comes in today with back pain without clear cause and a rash. She has had exposure to 2 coworkers with reported shingles. Rashes are covered under clothing. She reports chickenpox as a child. She has a hx of psoriasis, severe, without much relief from topicals on her scalp, back, arms, and legs. She does not moisturize daily or consistently use topicals. She denies dizziness, cough, fever, dyspnea, chest pain, diaphoresis, nausea, palpitations. Does report intermittent chest tightness. She has never had a blood clot. She denies dyspepsia or abdominal pain. She is not on birth control. Denies recent calf swelling or pain. She has no recent prolonged immobilizations. The back pain does not restrict her movements, she denies shoulder pains.      Back Pain       Duration: 2 weeks        Specific cause: lifting a shirt out of dryer-felt a pull in her neck this has subsided, unsure if related.    Description:   Location of pain: middle of back right  Character of pain: dull ache and constant, feels a burning feeling randomly, tender to touch.  Pain radiation:none  New numbness or weakness in legs, not attributed to pain:  no     Intensity:  7/10    History:   Pain interferes with job: No  History of back problems: no prior back problems  Any previous MRI or X-rays: None  Sees a specialist for back pain:  No  Therapies tried without relief: massage and NSAIDs    Alleviating factors:   Improved by: none      Precipitating factors:  Worsened by: Sitting and Lying Flat    Functional and Psychosocial Screen (Jb STarT Back):      Not  performed today        Accompanying Signs & Symptoms:  Risk of Fracture:  None  Risk of Cauda Equina:  None  Risk of Infection:  None  Risk of Cancer:  None  Risk of Ankylosing Spondylitis:  Onset at age <35, male, AND morning back stiffness. no         PROBLEMS TO ADD ON...  Rash  Onset: few weeks    Description:   Location: abdomen- RLQ  Character: flakey  Itching (Pruritis): no     Progression of Symptoms:  improving    Accompanying Signs & Symptoms:  Fever: no   Body aches or joint pain: no   Sore throat symptoms: no   Recent cold symptoms: no     History:   Previous similar rash: YES- psoriasis on scalp    Precipitating factors:   Exposure to similar rash: no   New exposures: shingles x 2 coworkers, she is training one with shingles on abdomen, not visible. She had chicken pox as a child.   Recent travel: no     Alleviating factors:  Steroid cream    Therapies Tried and outcome: improving    Problem list and histories reviewed & adjusted, as indicated.  Additional history: as documented    Patient Active Problem List   Diagnosis     Other psoriasis     Endometriosis of pelvic peritoneum     CARDIOVASCULAR SCREENING; LDL GOAL LESS THAN 160     Headache- mixed features between migraine and tension     Past Surgical History:   Procedure Laterality Date     LEEP TX, CERVICAL  4/13/09    BHARATI 2-3, clear margins     SURGICAL HISTORY OF -   08/06    Laparoscopic left cystectomy, lysis of adhesions, cautery of endometriosis       Social History   Substance Use Topics     Smoking status: Never Smoker     Smokeless tobacco: Never Used     Alcohol use Yes      Comment: occ.     Family History   Problem Relation Age of Onset     Psychotic Disorder Mother      anxiery     Lipids Father      Cancer - colorectal No family hx of      Breast Cancer No family hx of      CEREBROVASCULAR DISEASE No family hx of      Prostate Cancer No family hx of      C.A.D. No family hx of      Hypertension No family hx of              Reviewed  "and updated as needed this visit by clinical staffTobacco  Allergies  Med Hx  Surg Hx  Fam Hx  Soc Hx      Reviewed and updated as needed this visit by Provider         ROS:  Constitutional, HEENT, cardiovascular, pulmonary, gi and gu systems are negative, except as otherwise noted.      OBJECTIVE:   /81 (BP Location: Right arm, Patient Position: Sitting, Cuff Size: Adult Large)  Pulse 82  Temp 97.6  F (36.4  C) (Tympanic)  Resp 16  Ht 5' 0.5\" (1.537 m)  Wt 173 lb 12.8 oz (78.8 kg)  SpO2 98%  BMI 33.38 kg/m2  Body mass index is 33.38 kg/(m^2).  GENERAL: healthy, alert and no distress  EYES: Eyes grossly normal to inspection, PERRL and conjunctivae and sclerae normal  RESP: lungs clear to auscultation - no rales, rhonchi or wheezes  CV: regular rates and rhythm, normal S1 S2, no S3 or S4, no murmur, click or rub and no peripheral edema  ABDOMEN: soft, nontender, no hepatosplenomegaly, no masses and bowel sounds normal  MS: no gross musculoskeletal defects noted, no edema  SKIN: plaque - hairline, neck, lower back, right lower abdomen, bilateral shins.   NEURO: Normal strength and tone, mentation intact and speech normal  BACK: no CVA tenderness, no paralumbar tenderness  Comprehensive back pain exam:  Tenderness of thoracic para-spinal muscles extending to along right scapula., Range of motion not limited by pain and Lower extremity strength functional and equal on both sides  PSYCH: mentation appears normal, affect normal/bright    Diagnostic Test Results:  none     ASSESSMENT/PLAN:       ICD-10-CM    1. Back strain, initial encounter S39.012A cyclobenzaprine (FLEXERIL) 10 MG tablet   2. Psoriasis L40.9 calcipotriene (DOVONOX) 0.005 % SOLN solution     Reassured patient that abdominal rash is consistent with her other psoriasis lesions. This does not appear to be consistent with zoster rash. She requests a refill on her scalp medication. Exam consistent with musculoskeletal pain. No signs of PE, " pleurisy, infection, shingles. Did discuss if she were to develop a blistery painful rash over right scapula I would treat her with an antiviral, she can call me. Discussed that shingles is not contagious with prior chicken pox infection.    FUTURE APPOINTMENTS:       - Follow-up visit in 2 week for persistent back pain, sooner PRN new or worsening symptoms.     Patient Instructions     Back Sprain or Strain    Injury to the muscles (strain) or ligaments (sprain) around the spine can be troubling. Injury may occur after a sudden forceful twisting or bending force such as in a car accident, after a simple awkward movement, or after lifting something heavy with poor body positioning. In any case, muscle spasm is often present and adds to the pain.  Thankfully, most people feel better in 1 to 2 weeks, and most of the rest in 1 to 2 months. Most people can remain active. Unless you had a forceful or traumatic physical injury such as a car accident or fall, X-rays may not be ordered for the first evaluation of a back sprain or strain. If pain continues and does not respond to medical treatment, your healthcare provider may then order X-rays and other tests.  Home care  The following guidelines will help you care for your injury at home:    When in bed, try to find a comfortable position. A firm mattress is best. Try lying flat on your back with pillows under your knees. You can also try lying on your side with your knees bent up toward your chest and a pillow between your knees.    Don't sit for long periods. Try not to take long car rides or take other trips that have you sitting for a long time. This puts more stress on the lower back than standing or walking.    During the first 24 to 72 hours after an injury or flare-up, apply an ice pack to the painful area for 20 minutes. Then remove it for 20 minutes. Do this for 60 to 90 minutes, or several times a day. This will reduce swelling and pain. Be sure to wrap the ice  pack in a thin towel or plastic to protect your skin.    You can start with ice, then switch to heat. Heat from a hot shower, hot bath, or heating pad reduces pain and works well for muscle spasms. Put heat on the painful area for 20 minutes, then remove for 20 minutes. Do this for 60 to 90 minutes, or several times a day. Do not use a heating pad while sleeping. It can burn the skin.    You can alternate the ice and heat. Talk with your healthcare provider to find out the best treatment or therapy for your back pain.    Therapeutic massage will help relax the back muscles without stretching them.    Be aware of safe lifting methods. Do not lift anything over 15 pounds until all of the pain is gone.  Medicines  Talk to your healthcare provider before using medicines, especially if you have other health problems or are taking other medicines.    You may use acetaminophen or ibuprofen to control pain, unless another pain medicine was prescribed. If you have chronic conditions like diabetes, liver or kidney disease, stomach ulcers, or gastrointestinal bleeding, or are taking blood-thinner medicines, talk with your doctor before taking any medicines.    Be careful if you are given prescription medicines, narcotics, or medicine for muscle spasm. They can cause drowsiness, and affect your coordination, reflexes, and judgment. Do not drive or operate heavy machinery when taking these types of medicines. Only take pain medicine as prescribed by your healthcare provider.  Follow-up care  Follow up with your healthcare provider, or as advised. You may need physical therapy or more tests if your symptoms get worse.  If you had X-rays your healthcare provider may be checking for any broken bones, breaks, or fractures. Bruises and sprains can sometimes hurt as much as a fracture. These injuries can take time to heal completely. If your symptoms don t improve or they get worse, talk with your healthcare provider. You may need a  repeat X-ray or other tests.  Call 911  Call for emergency care if any of the following occur:    Trouble breathing    Confused    Very drowsy or trouble awakening    Fainting or loss of consciousness    Rapid or very slow heart rate    Loss of bowel or bladder control  When to seek medical advice  Call your healthcare provider right away if any of the following occur:    Pain gets worse or spreads to your arms or legs    Weakness or numbness in one or both arms or legs    Numbness in the groin or genital area  Date Last Reviewed: 6/1/2016 2000-2017 [employer]. [address] [place], [place] [id number]. All rights reserved. This information is not intended as a substitute for professional medical care. Always follow your healthcare professional's instructions.            ALFA Rome [facility]

## 2017-12-07 NOTE — TELEPHONE ENCOUNTER
Reason for call:  Patient reporting a symptom    Symptom or request: back pain-exposed to shingles    Duration (how long have symptoms been present): 1-1/2 weeks ago    Have you been treated for this before? No    Additional comments: pt calling stating she has 2 people in her office that have shingles. She states that for about a week and a half she has had back pain in between her shoulder blades but doesn't have a rash. She doesn't remember injuring or moving wrong and is wondering if she is getting shingles. She has no rash or any other symptoms.    Phone Number patient can be reached at:  Home number on file [phone] (home)    Best Time:  any    Can we leave a detailed message on this number:  YES    Call taken on 12/7/2017 at 9:01 AM by Audelia Gannon

## 2017-12-07 NOTE — TELEPHONE ENCOUNTER
Edith,    Patient is contacted and she would like the ointment sent to [facility] in [place].  (She asked that we send the liquid too to see if it is cheaper there.  I have sent it.).   Patient states hopefully her  will get a union job in March and the insurance will be better. Amada DIXON RN

## 2018-05-13 ENCOUNTER — HEALTH MAINTENANCE LETTER (OUTPATIENT)
Age: 33
End: 2018-05-13

## 2018-06-04 NOTE — LETTER
[facility]  [address] [facility] [address]  Phone: [phone]    March 9, 2017    Rebeca Sow  [address] [place] [address]          Dear Ms. Sow,    The results of your recent lab tests were : Blood sugar was a little on the low side at 64, this does not mean diabetes, just that the blood sugar was a little low if you had not eaten.LDL (bad) Cholesterol was just a little high, not too high to need medication.To improve your cholesterol exercise 30 minutes per day five days a week, increase eating fresh or frozen fruits and vegetables at least 5 per day, increase eating lean meats like fish, and avoid fried foods. A written copy of a low fat, low cholesterol diet is included with this letter.  Component      Latest Ref Rng & Units 3/7/2017   Cholesterol      <200 mg/dL 193   Triglycerides      <150 mg/dL 91   HDL Cholesterol      >49 mg/dL 52   LDL Cholesterol Calculated      <100 mg/dL 123 (H)   Non HDL Cholesterol      <130 mg/dL 141 (H)   Glucose      70 - 99 mg/dL 64 (L)      If you have any further questions or problems, please contact our office.    Sincerely,      Mario Saucedo MD / damien            Stable

## 2018-09-14 ENCOUNTER — TRANSFERRED RECORDS (OUTPATIENT)
Dept: HEALTH INFORMATION MANAGEMENT | Facility: CLINIC | Age: 33
End: 2018-09-14

## 2018-09-14 LAB — PAP SMEAR - HIM PATIENT REPORTED: NEGATIVE

## 2018-10-17 ENCOUNTER — OFFICE VISIT (OUTPATIENT)
Dept: FAMILY MEDICINE | Facility: CLINIC | Age: 33
End: 2018-10-17
Payer: COMMERCIAL

## 2018-10-17 VITALS
HEIGHT: 61 IN | OXYGEN SATURATION: 98 % | BODY MASS INDEX: 33.23 KG/M2 | DIASTOLIC BLOOD PRESSURE: 78 MMHG | SYSTOLIC BLOOD PRESSURE: 108 MMHG | TEMPERATURE: 98.1 F | HEART RATE: 87 BPM | WEIGHT: 176 LBS

## 2018-10-17 DIAGNOSIS — Z01.818 PREOP GENERAL PHYSICAL EXAM: Primary | ICD-10-CM

## 2018-10-17 DIAGNOSIS — D25.9 UTERINE LEIOMYOMA, UNSPECIFIED LOCATION: ICD-10-CM

## 2018-10-17 DIAGNOSIS — N76.0 BACTERIAL VAGINOSIS: ICD-10-CM

## 2018-10-17 DIAGNOSIS — R39.9 SYMPTOMS INVOLVING URINARY SYSTEM: ICD-10-CM

## 2018-10-17 DIAGNOSIS — N76.0 VAGINITIS AND VULVOVAGINITIS: ICD-10-CM

## 2018-10-17 DIAGNOSIS — L40.9 PSORIASIS: ICD-10-CM

## 2018-10-17 DIAGNOSIS — Z23 NEED FOR PROPHYLACTIC VACCINATION AND INOCULATION AGAINST INFLUENZA: ICD-10-CM

## 2018-10-17 DIAGNOSIS — B96.89 BACTERIAL VAGINOSIS: ICD-10-CM

## 2018-10-17 DIAGNOSIS — N80.30 ENDOMETRIOSIS OF PELVIC PERITONEUM: ICD-10-CM

## 2018-10-17 LAB
ALBUMIN UR-MCNC: NEGATIVE MG/DL
APPEARANCE UR: CLEAR
BILIRUB UR QL STRIP: NEGATIVE
COLOR UR AUTO: YELLOW
GLUCOSE UR STRIP-MCNC: NEGATIVE MG/DL
HGB UR QL STRIP: NEGATIVE
KETONES UR STRIP-MCNC: NEGATIVE MG/DL
LEUKOCYTE ESTERASE UR QL STRIP: NEGATIVE
NITRATE UR QL: NEGATIVE
PH UR STRIP: 5.5 PH (ref 5–7)
SOURCE: NORMAL
SP GR UR STRIP: >1.03 (ref 1–1.03)
SPECIMEN SOURCE: ABNORMAL
UROBILINOGEN UR STRIP-ACNC: 0.2 EU/DL (ref 0.2–1)
WET PREP SPEC: ABNORMAL

## 2018-10-17 PROCEDURE — 90686 IIV4 VACC NO PRSV 0.5 ML IM: CPT | Performed by: FAMILY MEDICINE

## 2018-10-17 PROCEDURE — 99214 OFFICE O/P EST MOD 30 MIN: CPT | Mod: 25 | Performed by: FAMILY MEDICINE

## 2018-10-17 PROCEDURE — 81003 URINALYSIS AUTO W/O SCOPE: CPT | Performed by: FAMILY MEDICINE

## 2018-10-17 PROCEDURE — 87210 SMEAR WET MOUNT SALINE/INK: CPT | Performed by: FAMILY MEDICINE

## 2018-10-17 PROCEDURE — 90471 IMMUNIZATION ADMIN: CPT | Performed by: FAMILY MEDICINE

## 2018-10-17 RX ORDER — BETAMETHASONE DIPROPIONATE 0.5 MG/ML
LOTION, AUGMENTED TOPICAL DAILY
Qty: 60 ML | Refills: 6 | Status: SHIPPED | OUTPATIENT
Start: 2018-10-17 | End: 2019-05-22

## 2018-10-17 RX ORDER — CALCIPOTRIENE 0.05 MG/ML
SOLUTION TOPICAL
Qty: 60 ML | Refills: 3 | Status: SHIPPED | OUTPATIENT
Start: 2018-10-17 | End: 2019-12-03

## 2018-10-17 RX ORDER — METRONIDAZOLE 500 MG/1
500 TABLET ORAL 2 TIMES DAILY
Qty: 14 TABLET | Refills: 0 | Status: SHIPPED | OUTPATIENT
Start: 2018-10-17 | End: 2018-10-24

## 2018-10-17 NOTE — PROGRESS NOTES
[facility]  [address] [address]  [phone]  Dept: [phone]    URINARY TRACT SYMPTOMS  Onset: 6 days    Description:   Painful urination (Dysuria): no   Blood in urine (Hematuria): no   Delay in urine (Hesitency): YES    Intensity: mild    Progression of Symptoms:  improving    Accompanying Signs & Symptoms:  Fever/chills: no   Flank pain no   Nausea and vomiting: no   Any vaginal symptoms: none  Abdominal/Pelvic Pain: no     History:   History of frequent UTI's: no   History of kidney stones: no   Sexually Active: YES  Possibility of pregnancy: No    Precipitating factors:   none    Therapies Tried and outcome: Cranberry juice prn (contraindicated in Coumadin patients)     PRE-OP EVALUATION:  Today's date: 10/17/2018    Rebeca Sow (: 1985) presents for pre-operative evaluation assessment as requested by Dr. Bridget Pacheco.  She requires evaluation and anesthesia risk assessment prior to undergoing surgery/procedure for treatment of endometriosis and fibroids .    Proposed Surgery/ Procedure: laparoscopic endometriosis and fibroids   Date of Surgery/ Procedure: 10/24/18  Time of Surgery/ Procedure: 11:00  Hospital/Surgical Facility: [facility]  Fax number for surgical facility: [phone]  Primary Physician: Mario Saucedo  Type of Anesthesia Anticipated: General    Patient has a Health Care Directive or Living Will:  NO    1. NO - Do you have a history of heart attack, stroke, stent, bypass or surgery on an artery in the head, neck, heart or legs?  2. NO - Do you ever have any pain or discomfort in your chest?  3. NO - Do you have a history of  Heart Failure?  4. NO - Are you troubled by shortness of breath when: walking on the level, up a slight hill or at night?  5. NO - Do you currently have a cold, bronchitis or other respiratory infection?  6. NO - Do you have a cough, shortness of breath or wheezing?  7. NO - Do you sometimes get  pains in the calves of your legs when you walk?  8. NO - Do you or anyone in your family have previous history of blood clots?  9. NO - Do you or does anyone in your family have a serious bleeding problem such as prolonged bleeding following surgeries or cuts?  10. NO - Have you ever had problems with anemia or been told to take iron pills?  11. NO - Have you had any abnormal blood loss such as black, tarry or bloody stools, or abnormal vaginal bleeding?  12. NO - Have you ever had a blood transfusion?  13. NO - Have you or any of your relatives ever had problems with anesthesia?  14. NO - Do you have sleep apnea, excessive snoring or daytime drowsiness?  15. NO - Do you have any prosthetic heart valves?  16. NO - Do you have prosthetic joints?  17. NO - Is there any chance that you may be pregnant? Abstinent this month prior to surgery.      HPI:     HPI related to upcoming procedure: Has had history of endometriosis with laparoscopy surgery 11 years ago.  Have been try for pregnancy for 2 years without success then US showed fibroid and planning to check for endometriosis as well. Gets stabing abdominal pain intermittently.  Periods are reguar monthly and every third month heavy crampy periods.      See problem list for active medical problems.  Problems all longstanding and stable, except as noted/documented.  See ROS for pertinent symptoms related to these conditions.                                                                                                                                                          .    MEDICAL HISTORY:     Patient Active Problem List    Diagnosis Date Noted     Headache- mixed features between migraine and tension 05/11/2017     Priority: Medium     CARDIOVASCULAR SCREENING; LDL GOAL LESS THAN 160 10/31/2010     Priority: Medium     Endometriosis of pelvic peritoneum 09/13/2006     Priority: Medium     S/p diagnostic laparoscopy 08/06 with findings of severe endometriosis,  left ovarian endometrioma, significant omental and bowel, and cul de sac adhesions lysed; 6 month course of DepoLupron started 09/06       Other psoriasis 07/26/2005     Priority: Medium      Past Medical History:   Diagnosis Date     BHARATI 3 4/13/09    s/p LEEP     Endometriosis of ovary     left ovarian endometrioma     Human papillomavirus in conditions classified elsewhere and of unspecified site     ext genital     Other psoriasis     scalp     Past Surgical History:   Procedure Laterality Date     LEEP TX, CERVICAL  4/13/09    BHARATI 2-3, clear margins     SURGICAL HISTORY OF -   08/06    Laparoscopic left cystectomy, lysis of adhesions, cautery of endometriosis     Current Outpatient Prescriptions   Medication Sig Dispense Refill     betamethasone, augmented, (DIPROLENE) 0.05 % lotion Apply topically daily 60 mL 6     calcipotriene (CALCITRENE) 0.005 % OINT Apply a thin layer to affected skin once or twice daily. 120 g 3     calcipotriene (DOVONOX) 0.005 % cream Apply topically At Bedtime 100 g 3     calcipotriene (DOVONOX) 0.005 % SOLN solution Apply to scalp at bedtime 60 mL 3     clobetasol (TEMOVATE) 0.05 % cream Apply sparingly to affected area daily as needed.  Do not apply to face. 120 g 3     rizatriptan (MAXALT) 5 MG tablet Take 1-2 tablets (5-10 mg) by mouth at onset of headache for migraine May repeat in 2 hours. Max 6 tablets/24 hours. (Patient not taking: Reported on 12/7/2017) 18 tablet 3     OTC products: None, except as noted above    No Known Allergies   Latex Allergy: NO    Social History   Substance Use Topics     Smoking status: Never Smoker     Smokeless tobacco: Never Used     Alcohol use Yes      Comment: occ.     History   Drug Use No       REVIEW OF SYSTEMS:   CONSTITUTIONAL: NEGATIVE for fever, chills, change in weight  INTEGUMENTARY/SKIN: NEGATIVE for worrisome rashes, moles or lesions  EYES: NEGATIVE for vision changes or irritation  ENT/MOUTH: NEGATIVE for ear, mouth and throat  "problems  RESP: NEGATIVE for significant cough or SOB  BREAST: NEGATIVE for masses, tenderness or discharge  CV: NEGATIVE for chest pain, palpitations or peripheral edema  GI: NEGATIVE for nausea, abdominal pain, heartburn, or change in bowel habits  : see HPI positive for frequency with small amounts. NEGATIVE for dysuria, or hematuria  MUSCULOSKELETAL: NEGATIVE for significant arthralgias or myalgia  NEURO: NEGATIVE for weakness, dizziness or paresthesias  ENDOCRINE: NEGATIVE for temperature intolerance, skin/hair changes  HEME: NEGATIVE for bleeding problems  PSYCHIATRIC: NEGATIVE for changes in mood or affect    EXAM:   /78  Pulse 87  Temp 98.1  F (36.7  C) (Tympanic)  Ht 5' 0.5\" (1.537 m)  Wt 176 lb (79.8 kg)  LMP 09/20/2018 (Exact Date)  SpO2 98%  BMI 33.81 kg/m2    GENERAL APPEARANCE: healthy, alert and no distress     EYES: EOMI, PERRL     HENT: ear canals and TM's normal and nose and mouth without ulcers or lesions     NECK: no adenopathy, no asymmetry, masses, or scars and thyroid normal to palpation     RESP: lungs clear to auscultation - no rales, rhonchi or wheezes     CV: regular rates and rhythm, normal S1 S2, no S3 or S4 and no murmur, click or rub     ABDOMEN:  soft, nontender, no HSM or masses and bowel sounds normal     MS: extremities normal- no gross deformities noted, no evidence of inflammation in joints, FROM in all extremities.     SKIN: no suspicious lesions or rashes     NEURO: Normal strength and tone, sensory exam grossly normal, mentation intact and speech normal     PSYCH: mentation appears normal. and affect normal/bright     LYMPHATICS: No cervical adenopathy    DIAGNOSTICS:   EKG: Not indicated due to non-vascular surgery and low risk of event (age <65 and without cardiac risk factors)    No results for input(s): HGB, PLT, INR, NA, POTASSIUM, CR, A1C in the last 69217 hours.   Results for orders placed or performed in visit on 10/17/18 (from the past 24 hour(s))   *UA " reflex to Microscopic and Culture ([facility] (except [place] and [place])   Result Value Ref Range    Color Urine Yellow     Appearance Urine Clear     Glucose Urine Negative NEG^Negative mg/dL    Bilirubin Urine Negative NEG^Negative    Ketones Urine Negative NEG^Negative mg/dL    Specific Gravity Urine >1.030 1.003 - 1.035    Blood Urine Negative NEG^Negative    pH Urine 5.5 5.0 - 7.0 pH    Protein Albumin Urine Negative NEG^Negative mg/dL    Urobilinogen Urine 0.2 0.2 - 1.0 EU/dL    Nitrite Urine Negative NEG^Negative    Leukocyte Esterase Urine Negative NEG^Negative    Source Midstream Urine    Wet prep   Result Value Ref Range    Specimen Description Vagina     Wet Prep No yeast seen     Wet Prep Clue cells seen (A)     Wet Prep No Trichomonas seen        IMPRESSION:   Reason for surgery/procedure: Fibroids and endometreosis  Diagnosis/reason for consult: Pre-op    The proposed surgical procedure is considered INTERMEDIATE risk.    REVISED CARDIAC RISK INDEX  The patient has the following serious cardiovascular risks for perioperative complications such as (MI, PE, VFib and 3  AV Block):  No serious cardiac risks  INTERPRETATION: 0 risks: Class I (very low risk - 0.4% complication rate)    The patient has the following additional risks for perioperative complications:  No identified additional risks      ICD-10-CM    1. Preop general physical exam Z01.818    2. Uterine leiomyoma, unspecified location D25.9    3. Endometriosis of pelvic peritoneum N80.3    4. Psoriasis L40.9 betamethasone, augmented, (DIPROLENE) 0.05 % lotion     calcipotriene (DOVONOX) 0.005 % SOLN solution   5. Symptoms involving urinary system: normal UA. R39.9 *UA reflex to Microscopic and Culture ([facility] (except [place] and [place])   Bacterial Vaginosis: plan metronidazole 500mg oral BID for 7 days.    RECOMMENDATIONS:     --Consult hospital rounder / IM to assist post-op medical  management    --Patient is to take all scheduled medications on the day of surgery EXCEPT for modifications listed below.    APPROVAL GIVEN to proceed with proposed procedure, without further diagnostic evaluation       Signed Electronically by: Mario Saucedo MD    Copy of this evaluation report is provided to requesting physician.    [facility] Preop Guidelines    Revised Cardiac Risk Index

## 2018-10-17 NOTE — PATIENT INSTRUCTIONS
On the day of surgery no medications.    No NSAIDs (ibuprofen, advil, aleve, aspirin) 7 days before surgery.   OK to take tylenol.    Thank you for choosing [facility].  You may be receiving a survey in the mail from [employer] regarding your visit today.  Please take a few minutes to complete and return the survey to let us know how we are doing.      If you have questions or concerns, please contact us via [employer] or you can contact your care team at [phone].    Our Clinic hours are:  Monday 6:40 am  to 7:00 pm  Tuesday -Friday 6:40 am to 5:00 pm    The [place] outpatient lab hours are:  Monday - Friday 6:10 am to 4:45 pm  Saturdays 7:00 am to 11:00 am  Appointments are required, call [phone]    If you have clinical questions after hours or would like to schedule an appointment,  call the clinic at [phone].    Before Your Surgery      Call your surgeon if there is any change in your health. This includes signs of a cold or flu (such as a sore throat, runny nose, cough, rash or fever).    Do not smoke, drink alcohol or take over the counter medicine (unless your surgeon or primary care doctor tells you to) for the 24 hours before and after surgery.    If you take prescribed drugs: Follow your doctor s orders about which medicines to take and which to stop until after surgery.    Eating and drinking prior to surgery: follow the instructions from your surgeon  Take a shower or bath the night before surgery. Use the soap your surgeon gave you to gently clean your skin. If you do not have soap from your surgeon, use your regular soap. Do not shave or scrub the surgery site.  Wear clean pajamas and have clean sheets on your bed.           Thank you for choosing [facility].  You may be receiving a survey in the mail from [employer] regarding your visit today.  Please take a few minutes to complete and return the survey to let us know how we are doing.      If you have questions or concerns,  please contact us via [employer] or you can contact your care team at [phone].    Our Clinic hours are:  Monday 6:40 am  to 7:00 pm  Tuesday -Friday 6:40 am to 5:00 pm    The [place] outpatient lab hours are:  Monday - Friday 6:10 am to 4:45 pm  Saturdays 7:00 am to 11:00 am  Appointments are required, call [phone]      If you have clinical questions after hours or would like to schedule an appointment,  call the clinic at [phone].

## 2018-10-17 NOTE — PROGRESS NOTES

## 2018-10-17 NOTE — MR AVS SNAPSHOT
After Visit Summary   10/17/2018    Rebeca Sow    MRN: [id number]           Patient Information     Date Of Birth          1985        Visit Information        Provider Department      10/17/2018 7:00 AM Mario Saucedo MD [facility]        Today's Diagnoses     Preop general physical exam    -  1    Uterine leiomyoma, unspecified location        Endometriosis of pelvic peritoneum        Psoriasis        Symptoms involving urinary system        Vaginitis and vulvovaginitis          Care Instructions    On the day of surgery no medications.    No NSAIDs (ibuprofen, advil, aleve, aspirin) 7 days before surgery.   OK to take tylenol.    Thank you for choosing [facility].  You may be receiving a survey in the mail from [employer] regarding your visit today.  Please take a few minutes to complete and return the survey to let us know how we are doing.      If you have questions or concerns, please contact us via [employer] or you can contact your care team at [phone].    Our Clinic hours are:  Monday 6:40 am  to 7:00 pm  Tuesday -Friday 6:40 am to 5:00 pm    The [place] outpatient lab hours are:  Monday - Friday 6:10 am to 4:45 pm  Saturdays 7:00 am to 11:00 am  Appointments are required, call [phone]    If you have clinical questions after hours or would like to schedule an appointment,  call the clinic at [phone].    Before Your Surgery      Call your surgeon if there is any change in your health. This includes signs of a cold or flu (such as a sore throat, runny nose, cough, rash or fever).    Do not smoke, drink alcohol or take over the counter medicine (unless your surgeon or primary care doctor tells you to) for the 24 hours before and after surgery.    If you take prescribed drugs: Follow your doctor s orders about which medicines to take and which to stop until after surgery.    Eating and drinking prior to surgery: follow the instructions from your  surgeon  Take a shower or bath the night before surgery. Use the soap your surgeon gave you to gently clean your skin. If you do not have soap from your surgeon, use your regular soap. Do not shave or scrub the surgery site.  Wear clean pajamas and have clean sheets on your bed.           Thank you for choosing [facility].  You may be receiving a survey in the mail from Mimi Webb regarding your visit today.  Please take a few minutes to complete and return the survey to let us know how we are doing.      If you have questions or concerns, please contact us via [employer] or you can contact your care team at [phone].    Our Clinic hours are:  Monday 6:40 am  to 7:00 pm  Tuesday -Friday 6:40 am to 5:00 pm    The [place] outpatient lab hours are:  Monday - Friday 6:10 am to 4:45 pm  Saturdays 7:00 am to 11:00 am  Appointments are required, call [phone]      If you have clinical questions after hours or would like to schedule an appointment,  call the clinic at [phone].          Follow-ups after your visit        Who to contact     If you have questions or need follow up information about today's clinic visit or your schedule please contact [facility] directly at [phone].  Normal or non-critical lab and imaging results will be communicated to you by [employer], letter or phone within 4 business days after the clinic has received the results. If you do not hear from us within 7 days, please contact the clinic through Where[employer] or phone. If you have a critical or abnormal lab result, we will notify you by phone as soon as possible.  Submit refill requests through [employer] or call your pharmacy and they will forward the refill request to us. Please allow 3 business days for your refill to be completed.          Additional Information About Your Visit        Care EveryWhere ID     This is your Care EveryWhere ID. This could be used by other organizations to access your [facility]  "records  [id number]        Your Vitals Were     Pulse Temperature Height Last Period Pulse Oximetry BMI (Body Mass Index)    87 98.1  F (36.7  C) (Tympanic) 5' 0.5\" (1.537 m) 09/20/2018 (Exact Date) 98% 33.81 kg/m2       Blood Pressure from Last 3 Encounters:   10/17/18 108/78   12/07/17 123/81   04/04/17 122/83    Weight from Last 3 Encounters:   10/17/18 176 lb (79.8 kg)   12/07/17 173 lb 12.8 oz (78.8 kg)   04/04/17 166 lb 6.4 oz (75.5 kg)              We Performed the Following     *UA reflex to Microscopic and Culture ([place] [facility] (except [place] and [place])     Wet prep          Where to get your medicines      These medications were sent to [facility] [place], [place] [address]  [address] [facility] [address]     Phone:  [phone]     betamethasone (augmented) 0.05 % lotion    calcipotriene 0.005 % Soln solution          Primary Care Provider Office Phone # Fax #    Mario Saucedo [phone] [address] [address]        Equal Access to Services     LAUREN OWENS AH: Hadii fam rodriguez hadasho Sojohnson, waaxda luqadaha, qaybta kaalmada meaghan, chelsey mireles. So [facility] [phone].    ATENCIÓN: Si habla español, tiene a travis disposición servicios gratuitos de asistencia lingüística. Kirt al [phone].    We comply with applicable federal civil rights laws and [place] laws. We do not discriminate on the basis of race, color, national origin, age, disability, sex, sexual orientation, or gender identity.            Thank you!     Thank you for choosing [facility]  for your care. Our goal is always to provide you with excellent care. Hearing back from our patients is one way we can continue to improve our services. Please take a few minutes to complete the written survey that you may receive in the mail after your visit with us. Thank you!           "   Your Updated Medication List - Protect others around you: Learn how to safely use, store and throw away your medicines at www.disposemymeds.org.          This list is accurate as of 10/17/18  7:43 AM.  Always use your most recent med list.                   Brand Name Dispense Instructions for use Diagnosis    betamethasone (augmented) 0.05 % lotion    DIPROLENE    60 mL    Apply topically daily    Psoriasis       * calcipotriene 0.005 % cream    DOVONOX    100 g    Apply topically At Bedtime    Psoriasis, Other psoriasis       * calcipotriene 0.005 % Oint    CALCITRENE    120 g    Apply a thin layer to affected skin once or twice daily.    Psoriasis       * calcipotriene 0.005 % Soln solution    DOVONOX    60 mL    Apply to scalp at bedtime    Psoriasis       clobetasol 0.05 % cream    TEMOVATE    120 g    Apply sparingly to affected area daily as needed.  Do not apply to face.    Psoriasis, Other psoriasis       rizatriptan 5 MG tablet    MAXALT    18 tablet    Take 1-2 tablets (5-10 mg) by mouth at onset of headache for migraine May repeat in 2 hours. Max 6 tablets/24 hours.    Intractable migraine without aura and with status migrainosus       * Notice:  This list has 3 medication(s) that are the same as other medications prescribed for you. Read the directions carefully, and ask your doctor or other care provider to review them with you.

## 2018-10-17 NOTE — Clinical Note
Please abstract the following data from this visit with this patient into the appropriate field in Epic:  Pap smear done on this date: 9/14/18 (approximately), by this group: [facility], results were negative .

## 2018-10-22 ASSESSMENT — MIFFLIN-ST. JEOR: SCORE: 1239.74

## 2018-10-23 ENCOUNTER — ANESTHESIA - HEALTHEAST (OUTPATIENT)
Dept: SURGERY | Facility: AMBULATORY SURGERY CENTER | Age: 33
End: 2018-10-23

## 2018-10-24 ENCOUNTER — SURGERY - HEALTHEAST (OUTPATIENT)
Dept: SURGERY | Facility: AMBULATORY SURGERY CENTER | Age: 33
End: 2018-10-24

## 2018-10-24 ASSESSMENT — MIFFLIN-ST. JEOR: SCORE: 1425.71

## 2018-10-29 ENCOUNTER — OFFICE VISIT (OUTPATIENT)
Dept: FAMILY MEDICINE | Facility: CLINIC | Age: 33
End: 2018-10-29
Payer: COMMERCIAL

## 2018-10-29 VITALS
OXYGEN SATURATION: 98 % | DIASTOLIC BLOOD PRESSURE: 74 MMHG | HEART RATE: 102 BPM | WEIGHT: 179.6 LBS | BODY MASS INDEX: 34.5 KG/M2 | SYSTOLIC BLOOD PRESSURE: 118 MMHG | TEMPERATURE: 98.8 F

## 2018-10-29 DIAGNOSIS — B37.0 THRUSH: Primary | ICD-10-CM

## 2018-10-29 PROCEDURE — 99213 OFFICE O/P EST LOW 20 MIN: CPT | Performed by: INTERNAL MEDICINE

## 2018-10-29 RX ORDER — CLOTRIMAZOLE 10 MG/1
1 LOZENGE ORAL
Qty: 70 TROCHE | Refills: 0 | Status: SHIPPED | OUTPATIENT
Start: 2018-10-29 | End: 2019-05-22

## 2018-10-29 NOTE — PATIENT INSTRUCTIONS
Candida Infection: Thrush  Thrush is a fungal infection in the mouth and throat. Thrush does not usually affect healthy adults. It is more common in people with a weak immune system. It is also more likely if you take antibiotics. Thrush is normally not contagious.  Understanding fungus in the mouth and throat  Your mouth and throat normally contain millions of tiny organisms. These include bacteria and yeasts. Many of these do not cause any problems. In fact, they may help fight disease.  Yeasts are a type of fungus. A type of yeast called Candida normally lives on the membranes of your mouth and throat. Usually, this yeast grows only in small amounts and is harmless. But in some cases, Candida can grow out of control and cause thrush. Thrush is related to other kinds of Candida infections that can grow all over the body. Thrush refers to an infection of only the mouth and throat.  What causes thrush?  Thrush happens when something lets too much Candida grow inside your mouth and throat. Certain things that change the normal balance of organisms in the mouth can lead to thrush. One example is antibiotic medicine. This medicine may kill some of the normal bacteria in your mouth. Candida can then grow freely. People on antibiotics have an increased risk for thrush.  You have a higher risk for thrush if you:    Wear dentures    Are getting chemotherapy    Are getting radiation therapy    Have diabetes    Have a transplanted organ    Use corticosteroids, including inhaled corticosteroids for lung disease    Have a weak immune system, such as from AIDS    Are an older adult  Symptoms of thrush  Symptoms of thrush can include:    A dry, cottony feeling in your mouth    Cracking at the corners of the mouth    Loss of taste    Pain while eating or swallowing    White patches on the tongue and around the sides of the mouth  Diagnosing thrush  Your healthcare provider will ask about your medical history and your symptoms.  He or she will look closely at your mouth and throat. White or red patches will be scraped with a tongue depressor. The sample will be sent to a lab to test. This test can usually confirm thrush.  If you have thrush, you may also have esophageal candidiasis. This is common in people who have HIV or a weak immune system. Your healthcare provider may check for this condition with an upper endoscopy. This is a procedure to look at the esophagus. A tissue sample may be taken to test.  Treatment for thrush  Thrush is usually treated with antifungal medicine. The medicine is put directly in your mouth and throat. You may be given a  swish and swallow  medicine or an antifungal lozenge.  In some cases, you may need an antifungal pill. This can remove Candida throughout your body. Or you may need medicine through an intravenous line ( IV). These treatments depend on how severe your infection is, and what other health conditions you have.  If you are at high risk for thrush, you may need to keep taking oral antifungal medicine. This is to help prevent thrush in the future.  What happens if you don t get treated for thrush?  If untreated, the Candida may spread throughout your body. They may even enter your bloodstream. This can cause serious problems, such as organ failure and even death. Bloodstream infection may need to be treated with high doses of antifungal medicine through an IV.  Systemic infection is much more likely in people who are very ill. It is also more common in those who have serious problems with their immune system. Additional risk factors for systemic infection in very ill people include:    Central venous lines    IV nutrition    Use of broad-spectrum antibiotics    Kidney failure    Recent surgery  Preventing thrush  You may be able to help prevent some cases of thrush. Make sure to:    Practice good oral hygiene. Try using a chlorhexidine mouthwash.    Clean your dentures regularly as instructed. Make  sure they fit you correctly.    After using a corticosteroid inhaler, rinse out your mouth with water or mouthwash.    Do not use broad-spectrum antibiotics, if possible.    Get treated for health problems that increase your risk for thrush, such as diabetes.     When to call the healthcare provider  Call your healthcare provider right away if you have any of these:    Cottony feeling in your mouth    Loss of taste    Pain while eating or swallowing    White patches or plaques on your tongue or inside your mouth   Date Last Reviewed: 5/1/2017 2000-2017 [employer]. [address] [place] [address]. All rights reserved. This information is not intended as a substitute for professional medical care. Always follow your healthcare professional's instructions.

## 2018-10-29 NOTE — PROGRESS NOTES
SUBJECTIVE:   Rebeca Sow is a 33 year old female who presents to clinic today for the following health issues:    Chief Complaint   Patient presents with     Mouth/Lip Problem     x 3 days, possible thrush     Possible thrush       Duration: x 3 days    Description (location/character/radiation): PATIENT reports being seen for a yeast infection and put on an antibiotic (was on metronidazole for BV), then having surgery on 10/24, hysteroscopy and felt her throat pain/discomfort was from that.  However, now her mouth/tongue is discolored and has a burning/itching sensation.     Intensity:  mild    Accompanying signs and symptoms: as noted     History (similar episodes/previous evaluation): None    Precipitating or alleviating factors: None    Therapies tried and outcome: None       Problem list and histories reviewed & adjusted, as indicated.  Additional history: as documented    Patient Active Problem List   Diagnosis     Other psoriasis     Endometriosis of pelvic peritoneum     CARDIOVASCULAR SCREENING; LDL GOAL LESS THAN 160     Headache- mixed features between migraine and tension     Past Surgical History:   Procedure Laterality Date     LEEP TX, CERVICAL  4/13/09    BHARATI 2-3, clear margins     SURGICAL HISTORY OF -   08/06    Laparoscopic left cystectomy, lysis of adhesions, cautery of endometriosis       Social History   Substance Use Topics     Smoking status: Current Some Day Smoker     Smokeless tobacco: Never Used     Alcohol use Yes      Comment: occ.     Family History   Problem Relation Age of Onset     Psychotic Disorder Mother      anxiery     Cancer - colorectal No family hx of      Breast Cancer No family hx of      Cerebrovascular Disease No family hx of      Prostate Cancer No family hx of      C.A.D. No family hx of      Hypertension No family hx of          Current Outpatient Prescriptions   Medication Sig Dispense Refill     betamethasone, augmented, (DIPROLENE) 0.05 % lotion Apply  topically daily 60 mL 6     calcipotriene (CALCITRENE) 0.005 % OINT Apply a thin layer to affected skin once or twice daily. 120 g 3     calcipotriene (DOVONOX) 0.005 % cream Apply topically At Bedtime 100 g 3     calcipotriene (DOVONOX) 0.005 % SOLN solution Apply to scalp at bedtime 60 mL 3     clotrimazole 10 MG andrea Place 1 Andrea (10 mg) inside cheek 5 times daily 70 Andrea 0     clobetasol (TEMOVATE) 0.05 % cream Apply sparingly to affected area daily as needed.  Do not apply to face. 120 g 3     rizatriptan (MAXALT) 5 MG tablet Take 1-2 tablets (5-10 mg) by mouth at onset of headache for migraine May repeat in 2 hours. Max 6 tablets/24 hours. (Patient not taking: Reported on 12/7/2017) 18 tablet 3     No Known Allergies    Reviewed and updated as needed this visit by clinical staff  Tobacco  Allergies  Meds  Med Hx  Surg Hx  Fam Hx  Soc Hx      Reviewed and updated as needed this visit by Provider         ROS:  Constitutional, HEENT systems are negative, except as otherwise noted.    OBJECTIVE:     /74 (BP Location: Left arm, Patient Position: Chair, Cuff Size: Adult Regular)  Pulse 102  Temp 98.8  F (37.1  C) (Tympanic)  Wt 179 lb 9.6 oz (81.5 kg)  SpO2 98%  BMI 34.5 kg/m2  Body mass index is 34.5 kg/(m^2).  GENERAL: healthy, alert and no distress  HENT: greenish patchy plaque on surface of tongue, some erythema in posterior oropharynx  NECK: no adenopathy, no asymmetry, masses, or scars and thyroid normal to palpation      ASSESSMENT/PLAN:       1. Thrush    Symptoms and exam are consistent with thrush, likely occurred from combo of recent intubation for surgery plus recent antibiotic treatment.  Will treat with clotrimazole for 2 weeks.  Follow-up if not improving.     - clotrimazole 10 MG andrea; Place 1 Andrea (10 mg) inside cheek 5 times daily  Dispense: 70 Andrea; Refill: 0      Darin Drake MD  [facility]

## 2018-10-29 NOTE — MR AVS SNAPSHOT
After Visit Summary   10/29/2018    Rebeca Sow    MRN: [id number]           Patient Information     Date Of Birth          1985        Visit Information        Provider Department      10/29/2018 2:40 PM Darin Drake MD [facility]        Today's Diagnoses     Thrush    -  1      Care Instructions      Candida Infection: Thrush  Thrush is a fungal infection in the mouth and throat. Thrush does not usually affect healthy adults. It is more common in people with a weak immune system. It is also more likely if you take antibiotics. Thrush is normally not contagious.  Understanding fungus in the mouth and throat  Your mouth and throat normally contain millions of tiny organisms. These include bacteria and yeasts. Many of these do not cause any problems. In fact, they may help fight disease.  Yeasts are a type of fungus. A type of yeast called Candida normally lives on the membranes of your mouth and throat. Usually, this yeast grows only in small amounts and is harmless. But in some cases, Candida can grow out of control and cause thrush. Thrush is related to other kinds of Candida infections that can grow all over the body. Thrush refers to an infection of only the mouth and throat.  What causes thrush?  Thrush happens when something lets too much Candida grow inside your mouth and throat. Certain things that change the normal balance of organisms in the mouth can lead to thrush. One example is antibiotic medicine. This medicine may kill some of the normal bacteria in your mouth. Candida can then grow freely. People on antibiotics have an increased risk for thrush.  You have a higher risk for thrush if you:    Wear dentures    Are getting chemotherapy    Are getting radiation therapy    Have diabetes    Have a transplanted organ    Use corticosteroids, including inhaled corticosteroids for lung disease    Have a weak immune system, such as from AIDS    Are an older adult  Symptoms  of thrush  Symptoms of thrush can include:    A dry, cottony feeling in your mouth    Cracking at the corners of the mouth    Loss of taste    Pain while eating or swallowing    White patches on the tongue and around the sides of the mouth  Diagnosing thrush  Your healthcare provider will ask about your medical history and your symptoms. He or she will look closely at your mouth and throat. White or red patches will be scraped with a tongue depressor. The sample will be sent to a lab to test. This test can usually confirm thrush.  If you have thrush, you may also have esophageal candidiasis. This is common in people who have HIV or a weak immune system. Your healthcare provider may check for this condition with an upper endoscopy. This is a procedure to look at the esophagus. A tissue sample may be taken to test.  Treatment for thrush  Thrush is usually treated with antifungal medicine. The medicine is put directly in your mouth and throat. You may be given a  swish and swallow  medicine or an antifungal lozenge.  In some cases, you may need an antifungal pill. This can remove Candida throughout your body. Or you may need medicine through an intravenous line ( IV). These treatments depend on how severe your infection is, and what other health conditions you have.  If you are at high risk for thrush, you may need to keep taking oral antifungal medicine. This is to help prevent thrush in the future.  What happens if you don t get treated for thrush?  If untreated, the Candida may spread throughout your body. They may even enter your bloodstream. This can cause serious problems, such as organ failure and even death. Bloodstream infection may need to be treated with high doses of antifungal medicine through an IV.  Systemic infection is much more likely in people who are very ill. It is also more common in those who have serious problems with their immune system. Additional risk factors for systemic infection in very  ill people include:    Central venous lines    IV nutrition    Use of broad-spectrum antibiotics    Kidney failure    Recent surgery  Preventing thrush  You may be able to help prevent some cases of thrush. Make sure to:    Practice good oral hygiene. Try using a chlorhexidine mouthwash.    Clean your dentures regularly as instructed. Make sure they fit you correctly.    After using a corticosteroid inhaler, rinse out your mouth with water or mouthwash.    Do not use broad-spectrum antibiotics, if possible.    Get treated for health problems that increase your risk for thrush, such as diabetes.     When to call the healthcare provider  Call your healthcare provider right away if you have any of these:    Cottony feeling in your mouth    Loss of taste    Pain while eating or swallowing    White patches or plaques on your tongue or inside your mouth   Date Last Reviewed: 5/1/2017 2000-2017 [employer]. [address] [place] [address]. All rights reserved. This information is not intended as a substitute for professional medical care. Always follow your healthcare professional's instructions.                Follow-ups after your visit        Follow-up notes from your care team     Return in about 2 weeks (around 11/12/2018), or if symptoms worsen or fail to improve.      Who to contact     If you have questions or need follow up information about today's clinic visit or your schedule please contact [facility] directly at [phone].  Normal or non-critical lab and imaging results will be communicated to you by MyChart, letter or phone within 4 business days after the clinic has received the results. If you do not hear from us within 7 days, please contact the clinic through MyChart or phone. If you have a critical or abnormal lab result, we will notify you by phone as soon as possible.  Submit refill requests through [email or web address] or call your pharmacy and they will forward the  refill request to us. Please allow 3 business days for your refill to be completed.          Additional Information About Your Visit        Care EveryWhere ID     This is your Care EveryWhere ID. This could be used by other organizations to access your [facility] medical records  [id number]        Your Vitals Were     Pulse Temperature Pulse Oximetry BMI (Body Mass Index)          102 98.8  F (37.1  C) (Tympanic) 98% 34.5 kg/m2         Blood Pressure from Last 3 Encounters:   10/29/18 118/74   10/17/18 108/78   12/07/17 123/81    Weight from Last 3 Encounters:   10/29/18 179 lb 9.6 oz (81.5 kg)   10/17/18 176 lb (79.8 kg)   12/07/17 173 lb 12.8 oz (78.8 kg)              Today, you had the following     No orders found for display         Today's Medication Changes          These changes are accurate as of 10/29/18  3:06 PM.  If you have any questions, ask your nurse or doctor.               Start taking these medicines.        Dose/Directions    clotrimazole 10 MG kd   Used for:  Thrush   Started by:  Darin Drake MD        Dose:  1 Kd   Place 1 Kd (10 mg) inside cheek 5 times daily   Quantity:  70 Kd   Refills:  0            Where to get your medicines      These medications were sent to [facility] [address]  [address] [place] [address]     Phone:  [phone]     clotrimazole 10 MG kd                Primary Care Provider Office Phone # Fax #    Mario Rebeca Saucedo [phone]       [address] [facility] [address]        Equal Access to Services     LAUREN OWENS AH: Hadchasity Burt, rupa england, qachelsey mcnamara. So [facility] [phone].    ATENCIÓN: Si habla español, tiene a travis disposición servicios gratuitos de asistencia lingüística. Kirt estrada [phone].    We comply with applicable federal civil rights laws and [place] laws. We do not discriminate on  the basis of race, color, national origin, age, disability, sex, sexual orientation, or gender identity.            Thank you!     Thank you for choosing [facility]  for your care. Our goal is always to provide you with excellent care. Hearing back from our patients is one way we can continue to improve our services. Please take a few minutes to complete the written survey that you may receive in the mail after your visit with us. Thank you!             Your Updated Medication List - Protect others around you: Learn how to safely use, store and throw away your medicines at www.disposemymeds.org.          This list is accurate as of 10/29/18  3:06 PM.  Always use your most recent med list.                   Brand Name Dispense Instructions for use Diagnosis    betamethasone (augmented) 0.05 % lotion    DIPROLENE    60 mL    Apply topically daily    Psoriasis       * calcipotriene 0.005 % cream    DOVONOX    100 g    Apply topically At Bedtime    Psoriasis, Other psoriasis       * calcipotriene 0.005 % Oint    CALCITRENE    120 g    Apply a thin layer to affected skin once or twice daily.    Psoriasis       * calcipotriene 0.005 % Soln solution    DOVONOX    60 mL    Apply to scalp at bedtime    Psoriasis       clobetasol 0.05 % cream    TEMOVATE    120 g    Apply sparingly to affected area daily as needed.  Do not apply to face.    Psoriasis, Other psoriasis       clotrimazole 10 MG andrea     70 Andrea    Place 1 Andrea (10 mg) inside cheek 5 times daily    Thrush       rizatriptan 5 MG tablet    MAXALT    18 tablet    Take 1-2 tablets (5-10 mg) by mouth at onset of headache for migraine May repeat in 2 hours. Max 6 tablets/24 hours.    Intractable migraine without aura and with status migrainosus       * Notice:  This list has 3 medication(s) that are the same as other medications prescribed for you. Read the directions carefully, and ask your doctor or other care provider to review them with you.

## 2019-05-22 ENCOUNTER — OFFICE VISIT (OUTPATIENT)
Dept: FAMILY MEDICINE | Facility: CLINIC | Age: 34
End: 2019-05-22
Payer: COMMERCIAL

## 2019-05-22 VITALS
BODY MASS INDEX: 29.34 KG/M2 | SYSTOLIC BLOOD PRESSURE: 114 MMHG | TEMPERATURE: 97.3 F | RESPIRATION RATE: 16 BRPM | WEIGHT: 155.4 LBS | HEIGHT: 61 IN | OXYGEN SATURATION: 98 % | DIASTOLIC BLOOD PRESSURE: 78 MMHG | HEART RATE: 86 BPM

## 2019-05-22 DIAGNOSIS — J06.9 VIRAL URI WITH COUGH: Primary | ICD-10-CM

## 2019-05-22 DIAGNOSIS — J20.9 ACUTE BRONCHITIS, UNSPECIFIED ORGANISM: ICD-10-CM

## 2019-05-22 DIAGNOSIS — L40.9 PSORIASIS: ICD-10-CM

## 2019-05-22 PROCEDURE — 99214 OFFICE O/P EST MOD 30 MIN: CPT | Performed by: FAMILY MEDICINE

## 2019-05-22 RX ORDER — CALCIPOTRIENE 50 UG/G
OINTMENT TOPICAL
Qty: 120 G | Refills: 3 | Status: SHIPPED | OUTPATIENT
Start: 2019-05-22 | End: 2020-04-07

## 2019-05-22 RX ORDER — BETAMETHASONE DIPROPIONATE 0.5 MG/ML
LOTION, AUGMENTED TOPICAL DAILY
Qty: 60 ML | Refills: 6 | Status: SHIPPED | OUTPATIENT
Start: 2019-05-22 | End: 2019-12-03

## 2019-05-22 ASSESSMENT — MIFFLIN-ST. JEOR: SCORE: 1342.27

## 2019-05-22 NOTE — PROGRESS NOTES
"Subjective     Rebeca Sow is a 34 year old female who presents to clinic today for the following health issues:    HPI   Acute Illness   Acute illness concerns: Sinus problem  Onset: x 5 days    Fever: no    Chills/Sweats: no    Headache (location?): no    Sinus Pressure:YES- tender, post-nasal drainage and facial pain    Conjunctivitis:  no    Ear Pain: no    Rhinorrhea: no    Congestion: YES    Sore Throat: no     Cough: YES    Wheeze: YES    Decreased Appetite: no    Nausea: no    Vomiting: no    Diarrhea:  no    Dysuria/Freq.: no    Fatigue/Achiness: YES- fatigue    Sick/Strep Exposure: no, sick contacts at work     Therapies Tried and outcome: Flonase, mucinex, benadryl and Nyquil.  Started with allergy like symptoms watery eyes and sneezing.  Feeling mostly last night and today trouble with breathing and worsens in the evening.    As a kid had inhaler once for basketball and once as adult for bronchitis.  Smoke: occasionally, not for two weeks.      BP Readings from Last 3 Encounters:   05/22/19 114/78   10/29/18 118/74   10/17/18 108/78    Wt Readings from Last 3 Encounters:   05/22/19 70.5 kg (155 lb 6.4 oz)   10/29/18 81.5 kg (179 lb 9.6 oz)   10/17/18 79.8 kg (176 lb)             Psoriasis: Fair control, but flares up when sick like in the last week.  Has patches all over the body, but still considering pregnancy so not ready for methotrexate or biologic.     Reviewed and updated as needed this visit by Provider         Review of Systems   ROS COMP: Constitutional, HEENT, cardiovascular, pulmonary, gi and gu systems are negative, except as otherwise noted.      Objective    /78   Pulse 86   Temp 97.3  F (36.3  C) (Tympanic)   Resp 16   Ht 1.549 m (5' 1\")   Wt 70.5 kg (155 lb 6.4 oz)   LMP 05/07/2019 (Exact Date)   SpO2 98%   BMI 29.36 kg/m    Body mass index is 29.36 kg/m .  Physical Exam   GENERAL: healthy, alert and no distress  HENT: ear canals and TM's normal, nose and mouth without " "ulcers or lesions  NECK: no adenopathy, no asymmetry, masses, or scars and thyroid normal to palpation  RESP: expiratory wheezes R lower posterior and L lower posterior  CV: regular rate and rhythm, normal S1 S2, no S3 or S4, no murmur, click or rub, no peripheral edema and peripheral pulses strong  MS: no gross musculoskeletal defects noted, no edema  SKIN: in ears, behind ears silvery plaques.    Diagnostic Test Results:  Labs reviewed in Epic        Assessment & Plan     Rebeca was seen today for sinus problem.    Diagnoses and all orders for this visit:    Viral URI with cough &  Acute bronchitis, unspecified organism: discussed likely viral URI  -plan symptomatic treatment  -if not improving by Friday I would consider antibiotic for sinusitis then, but not today as it has only been 6 days.  No sign of pneumonia.    Psoriasis: stable, refilled.  -     calcipotriene (CALCITRENE) 0.005 % external ointment; Apply a thin layer to affected skin once or twice daily.  -     augmented betamethasone dipropionate (DIPROLENE) 0.05 % external lotion; Apply topically daily         Tobacco Cessation:   reports that she has been smoking rare occasion. She has never used smokeless tobacco.  Tobacco Cessation Action Plan: Information offered: Patient not interested at this time      BMI:   Estimated body mass index is 29.36 kg/m  as calculated from the following:    Height as of this encounter: 1.549 m (5' 1\").    Weight as of this encounter: 70.5 kg (155 lb 6.4 oz).   Weight management plan: Discussed healthy diet and exercise guidelines        Patient Instructions     Common Cold/Viral Upper Respiratory Infection:   Last about 5 days and cough can last 2-3 weeks.  Come back to clinic or go to ER if difficulty breathing, persistent fevers over 100.4, or not able to stay hydrated.  Treating the Common Cold  Starting zinc lozenges (zinc acetate or zinc gluconate) within the first 24 hours of symptom onset reduces the severity and " duration of cold.  Early use of Echinacea purpurea shortens duration and decreases severity of cold symptoms.  Decongestants With or Without Antihistamines: Pseudoephedrine(at the pharmacy counter) or phenylephrine decrease nasal swelling to improve air intake and antihistamine (like Benadryl, doxyllamine, or chloripramine) help decrease sneezing and cough, but can make you sleepy so these are commonly in the nighttime cold/flu medications.  Nasal decongestant Afrin (oxymetolazone) twice a day for up to 3 days only (or if you use it longer your nose gets addicted to it)  Ibuprofen 200mg-400mg every 4 hours can help with body aches, fevers, cough, and sneezing.  Nasal saline rinse or NediPot for congestion, as often as needed.  Increase fluid intake, hot tea with honey can help with a sore throat or cough.  Cold Prevention:  Frequent hand washing can reduce the spread of respiratory viruses in all ages and can reduce transmission from children to other household members  The prophylactic use of vitamin C does not reduce the amount of colds a person gets, but decreases how long the cold lasts by about a day.  If by Friday sinuses are not improving then plan antibiotic for the sinusitis.  Continue flonase.      Patient Education     Causes of Sinusitis  Mucus helps keep your sinuses clean. But mucus may build up in the sinuses because of colds, allergies, or blockages. These things get in the way of the natural drainage of mucus. This may lead to sinusitis. Sinusitis means sinus inflammation and infection.    Acute sinusitis comes on suddenly. It often happens right after an upper respiratory infection, such as a cold. Viruses cause most acute sinus infections.    Chronic sinusitis is ongoing swelling of the sinus lining. Doctors don't know what causes chronic sinusitis.      Colds and other infections  A cold or flu may cause your sinus and nasal linings to swell. Sinus openings can become blocked. This causes mucus  to back up. This backed-up mucus becomes an ideal place for bacteria to grow. Thick, yellow, or discolored mucus is one sign of infection.  Allergic reactions  You may be sensitive to certain substances. This causes the release of histamine in the body. Histamine makes your sinus and nasal linings swell. Long-term swelling clogs your sinuses. It prevents the tiny hairs (cilia) in the nasal lining from sweeping away mucus. Allergy symptoms can continue over time. But they re less severe than with colds.  Blockages    A polyp is a sac of swollen tissue. It can be the result of an allergy or infection. It may block the opening where most of your sinuses drain (middle meatus). It may even grow large enough to block your nose.    A deviated septum is when the thin wall inside your nose is pushed to one side. It is often the result of injury. This can block your middle meatus.  People with chronic nasal problems or allergies are more likely to get acute sinusitis. Sinusitis is also more common if you have a weakened immune system, such as with HIV. You are also more likely to get sinusitis if you have cystic fibrosis or another condition that causes your body to make extra mucus.  Date Last Reviewed: 10/1/2016    0755-2193 [employer]. [address] [place] [address], [place] [address]. All rights reserved. This information is not intended as a substitute for professional medical care. Always follow your healthcare professional's instructions.           Patient Education     Self-Care for Sinusitis     Drinking plenty of water can help sinuses drain.   Sinusitis can often be managed with self-care. Self-care can keep sinuses moist and make you feel more comfortable. Remember to follow your doctor's instructions closely. This can make a big difference in getting your sinus problem under control.  Drink fluids  Drinking extra fluids helps thin your mucus. This lets it drain from your sinuses more easily. Have a glass  of water every hour or two. A humidifier helps in much the same way. Fluids can also offset the drying effects of certain medicines. If you use a humidifier, follow the product maker's instructions on how to use it. Clean it on a regular schedule.  Use saltwater rinses  Rinses help keep your sinuses and nose moist. Mix a teaspoon of salt in 8 ounces of fresh, warm water. Use a bulb syringe to gently squirt the water into your nose a few times a day. You can also buy ready-made saline nasal sprays.  Apply hot or cold packs  Applying heat to the area surrounding your sinuses may make you feel more comfortable. Use a hot water bottle or a hand towel dipped in hot water. Some people also find ice packs effective for relieving pain.  Medicines  Your doctor may prescribe medications to help treat your sinusitis. If you have an infection, antibiotics can help clear it up. If you are prescribed antibiotics, take all pills on schedule until they are gone, even if you feel better. Decongestants help relieve swelling. Use decongestant sprays for short periods only under the direction of your doctor. If you have allergies, your doctor may prescribe medications to help relieve them.   Date Last Reviewed: 10/1/2016    2914-0328 [employer]. [address] [place] [address], [place] [address]. All rights reserved. This information is not intended as a substitute for professional medical care. Always follow your healthcare professional's instructions.           Patient Education     Viral Bronchitis (Adult)    You have a viral bronchitis. Bronchitis is inflammation and swelling of the lining of the lungs. This is often caused by an infection. Symptoms include a dry, hacking cough that is worse at night. The cough may bring up yellow-green mucus. You may also feel short of breath or wheeze. Other symptoms may include tiredness, chest discomfort, and chills.  Bronchitis that is caused by a virus is not treated with antibiotics.  Instead, medicines may be given to help relieve symptoms. Symptoms can last up to 2 weeks, although the cough may last much longer.  This illness is contagious during the first few days and is spread through the air by coughing and sneezing, or by direct contact (touching the sick person and then touching your own eyes, nose, or mouth).  Most viral illnesses resolve within 10 to 14 days with rest and simple home remedies, although they may sometimes last for several weeks.  Home care    If symptoms are severe, rest at home for the first 2 to 3 days. When you go back to your usual activities, don't let yourself get too tired.    Do not smoke. Also avoid being exposed to secondhand smoke.    You may use over-the-counter medicine to control fever or pain, unless another pain medicine was prescribed. If you have chronic liver or kidney disease or have ever had a stomach ulcer or gastrointestinal bleeding, talk with your healthcare provider before using these medicines. Also talk to your provider if you are taking medicine to prevent blood clots. Aspirin should never be given to anyone younger than 18 years of age who is ill with a viral infection or fever. It may cause severe liver or brain damage.    Your appetite may be poor, so a light diet is fine. Avoid dehydration by drinking 6 to 8 glasses of fluids per day (such as water, soft drinks, sports drinks, juices, tea, or soup). Extra fluids will help loosen secretions in the nose and lungs.    Over-the-counter cough, cold, and sore-throat medicines will not shorten the length of the illness, but they may help to reduce symptoms. Don't use decongestants if you have high blood pressure.  Follow-up care  Follow up with your healthcare provider, or as advised. If you had an X-ray or ECG (electrocardiogram), a specialist will review it. You will be notified of any new findings that may affect your care.  If you are age 65 or older, or if you have a chronic lung disease or  condition that affects your immune system, or you smoke, ask your healthcare provider about getting a pneumococcal vaccine and a yearly flu shot (influenza vaccine).  When to seek medical advice  Call your healthcare provider right away if any of these occur:    Fever of 100.4 F (38 C) or higher, or as directed by your healthcare provider    Coughing up increased amounts of colored sputum    Weakness, drowsiness, headache, facial pain, ear pain, or a stiff neck  Call 911  Call 911 if any of these occur:    Coughing up blood    Worsening weakness, drowsiness, headache, or stiff neck    Trouble breathing, wheezing, or pain with breathing  Date Last Reviewed: 6/1/2018 2000-2018 [employer]. [address] [place] [address], [place] [address]. All rights reserved. This information is not intended as a substitute for professional medical care. Always follow your healthcare professional's instructions.               Return if symptoms worsen or fail to improve.    Mario Saucedo MD  [facility] [place]

## 2019-05-22 NOTE — PATIENT INSTRUCTIONS
Common Cold/Viral Upper Respiratory Infection:   Last about 5 days and cough can last 2-3 weeks.  Come back to clinic or go to ER if difficulty breathing, persistent fevers over 100.4, or not able to stay hydrated.  Treating the Common Cold  Starting zinc lozenges (zinc acetate or zinc gluconate) within the first 24 hours of symptom onset reduces the severity and duration of cold.  Early use of Echinacea purpurea shortens duration and decreases severity of cold symptoms.  Decongestants With or Without Antihistamines: Pseudoephedrine(at the pharmacy counter) or phenylephrine decrease nasal swelling to improve air intake and antihistamine (like Benadryl, doxyllamine, or chloripramine) help decrease sneezing and cough, but can make you sleepy so these are commonly in the nighttime cold/flu medications.  Nasal decongestant Afrin (oxymetolazone) twice a day for up to 3 days only (or if you use it longer your nose gets addicted to it)  Ibuprofen 200mg-400mg every 4 hours can help with body aches, fevers, cough, and sneezing.  Nasal saline rinse or NediPot for congestion, as often as needed.  Increase fluid intake, hot tea with honey can help with a sore throat or cough.  Cold Prevention:  Frequent hand washing can reduce the spread of respiratory viruses in all ages and can reduce transmission from children to other household members  The prophylactic use of vitamin C does not reduce the amount of colds a person gets, but decreases how long the cold lasts by about a day.  If by Friday sinuses are not improving then plan antibiotic for the sinusitis.  Continue flonase.      Patient Education     Causes of Sinusitis  Mucus helps keep your sinuses clean. But mucus may build up in the sinuses because of colds, allergies, or blockages. These things get in the way of the natural drainage of mucus. This may lead to sinusitis. Sinusitis means sinus inflammation and infection.    Acute sinusitis comes on suddenly. It often happens  right after an upper respiratory infection, such as a cold. Viruses cause most acute sinus infections.    Chronic sinusitis is ongoing swelling of the sinus lining. Doctors don't know what causes chronic sinusitis.      Colds and other infections  A cold or flu may cause your sinus and nasal linings to swell. Sinus openings can become blocked. This causes mucus to back up. This backed-up mucus becomes an ideal place for bacteria to grow. Thick, yellow, or discolored mucus is one sign of infection.  Allergic reactions  You may be sensitive to certain substances. This causes the release of histamine in the body. Histamine makes your sinus and nasal linings swell. Long-term swelling clogs your sinuses. It prevents the tiny hairs (cilia) in the nasal lining from sweeping away mucus. Allergy symptoms can continue over time. But they re less severe than with colds.  Blockages    A polyp is a sac of swollen tissue. It can be the result of an allergy or infection. It may block the opening where most of your sinuses drain (middle meatus). It may even grow large enough to block your nose.    A deviated septum is when the thin wall inside your nose is pushed to one side. It is often the result of injury. This can block your middle meatus.  People with chronic nasal problems or allergies are more likely to get acute sinusitis. Sinusitis is also more common if you have a weakened immune system, such as with HIV. You are also more likely to get sinusitis if you have cystic fibrosis or another condition that causes your body to make extra mucus.  Date Last Reviewed: 10/1/2016    8892-2911 [employer]. [address] [place] [address], [place] [address]. All rights reserved. This information is not intended as a substitute for professional medical care. Always follow your healthcare professional's instructions.           Patient Education     Self-Care for Sinusitis     Drinking plenty of water can help sinuses drain.    Sinusitis can often be managed with self-care. Self-care can keep sinuses moist and make you feel more comfortable. Remember to follow your doctor's instructions closely. This can make a big difference in getting your sinus problem under control.  Drink fluids  Drinking extra fluids helps thin your mucus. This lets it drain from your sinuses more easily. Have a glass of water every hour or two. A humidifier helps in much the same way. Fluids can also offset the drying effects of certain medicines. If you use a humidifier, follow the product maker's instructions on how to use it. Clean it on a regular schedule.  Use saltwater rinses  Rinses help keep your sinuses and nose moist. Mix a teaspoon of salt in 8 ounces of fresh, warm water. Use a bulb syringe to gently squirt the water into your nose a few times a day. You can also buy ready-made saline nasal sprays.  Apply hot or cold packs  Applying heat to the area surrounding your sinuses may make you feel more comfortable. Use a hot water bottle or a hand towel dipped in hot water. Some people also find ice packs effective for relieving pain.  Medicines  Your doctor may prescribe medications to help treat your sinusitis. If you have an infection, antibiotics can help clear it up. If you are prescribed antibiotics, take all pills on schedule until they are gone, even if you feel better. Decongestants help relieve swelling. Use decongestant sprays for short periods only under the direction of your doctor. If you have allergies, your doctor may prescribe medications to help relieve them.   Date Last Reviewed: 10/1/2016    5238-2625 [employer]. [address] [place] [id number]. All rights reserved. This information is not intended as a substitute for professional medical care. Always follow your healthcare professional's instructions.           Patient Education     Viral Bronchitis (Adult)    You have a viral bronchitis. Bronchitis is  inflammation and swelling of the lining of the lungs. This is often caused by an infection. Symptoms include a dry, hacking cough that is worse at night. The cough may bring up yellow-green mucus. You may also feel short of breath or wheeze. Other symptoms may include tiredness, chest discomfort, and chills.  Bronchitis that is caused by a virus is not treated with antibiotics. Instead, medicines may be given to help relieve symptoms. Symptoms can last up to 2 weeks, although the cough may last much longer.  This illness is contagious during the first few days and is spread through the air by coughing and sneezing, or by direct contact (touching the sick person and then touching your own eyes, nose, or mouth).  Most viral illnesses resolve within 10 to 14 days with rest and simple home remedies, although they may sometimes last for several weeks.  Home care    If symptoms are severe, rest at home for the first 2 to 3 days. When you go back to your usual activities, don't let yourself get too tired.    Do not smoke. Also avoid being exposed to secondhand smoke.    You may use over-the-counter medicine to control fever or pain, unless another pain medicine was prescribed. If you have chronic liver or kidney disease or have ever had a stomach ulcer or gastrointestinal bleeding, talk with your healthcare provider before using these medicines. Also talk to your provider if you are taking medicine to prevent blood clots. Aspirin should never be given to anyone younger than 18 years of age who is ill with a viral infection or fever. It may cause severe liver or brain damage.    Your appetite may be poor, so a light diet is fine. Avoid dehydration by drinking 6 to 8 glasses of fluids per day (such as water, soft drinks, sports drinks, juices, tea, or soup). Extra fluids will help loosen secretions in the nose and lungs.    Over-the-counter cough, cold, and sore-throat medicines will not shorten the length of the illness,  but they may help to reduce symptoms. Don't use decongestants if you have high blood pressure.  Follow-up care  Follow up with your healthcare provider, or as advised. If you had an X-ray or ECG (electrocardiogram), a specialist will review it. You will be notified of any new findings that may affect your care.  If you are age 65 or older, or if you have a chronic lung disease or condition that affects your immune system, or you smoke, ask your healthcare provider about getting a pneumococcal vaccine and a yearly flu shot (influenza vaccine).  When to seek medical advice  Call your healthcare provider right away if any of these occur:    Fever of 100.4 F (38 C) or higher, or as directed by your healthcare provider    Coughing up increased amounts of colored sputum    Weakness, drowsiness, headache, facial pain, ear pain, or a stiff neck  Call 911  Call 911 if any of these occur:    Coughing up blood    Worsening weakness, drowsiness, headache, or stiff neck    Trouble breathing, wheezing, or pain with breathing  Date Last Reviewed: 6/1/2018 2000-2018 [employer]. [address] [place] [address]. All rights reserved. This information is not intended as a substitute for professional medical care. Always follow your healthcare professional's instructions.         [email or web address]  Please refer all ID changes, password resets, disables/locked accounts, and deactivating accounts to   Access Services: [phone]

## 2019-11-04 ENCOUNTER — HEALTH MAINTENANCE LETTER (OUTPATIENT)
Age: 34
End: 2019-11-04

## 2019-11-18 ENCOUNTER — APPOINTMENT (OUTPATIENT)
Dept: MRI IMAGING | Facility: CLINIC | Age: 34
End: 2019-11-18
Attending: EMERGENCY MEDICINE
Payer: COMMERCIAL

## 2019-11-18 ENCOUNTER — HOSPITAL ENCOUNTER (EMERGENCY)
Facility: CLINIC | Age: 34
Discharge: HOME | End: 2019-11-18
Attending: EMERGENCY MEDICINE | Admitting: EMERGENCY MEDICINE
Payer: COMMERCIAL

## 2019-11-18 VITALS
HEART RATE: 71 BPM | BODY MASS INDEX: 28.32 KG/M2 | DIASTOLIC BLOOD PRESSURE: 83 MMHG | RESPIRATION RATE: 16 BRPM | TEMPERATURE: 98.3 F | OXYGEN SATURATION: 97 % | WEIGHT: 150 LBS | SYSTOLIC BLOOD PRESSURE: 116 MMHG | HEIGHT: 61 IN

## 2019-11-18 DIAGNOSIS — R20.2 PARESTHESIAS: ICD-10-CM

## 2019-11-18 LAB
ANION GAP SERPL CALCULATED.3IONS-SCNC: 5 MMOL/L (ref 3–14)
BASOPHILS # BLD AUTO: 0.1 10E9/L (ref 0–0.2)
BASOPHILS NFR BLD AUTO: 0.7 %
BUN SERPL-MCNC: 17 MG/DL (ref 7–30)
CALCIUM SERPL-MCNC: 8.8 MG/DL (ref 8.5–10.1)
CHLORIDE SERPL-SCNC: 108 MMOL/L (ref 94–109)
CO2 SERPL-SCNC: 27 MMOL/L (ref 20–32)
CREAT SERPL-MCNC: 0.67 MG/DL (ref 0.52–1.04)
DIFFERENTIAL METHOD BLD: NORMAL
EOSINOPHIL # BLD AUTO: 0.1 10E9/L (ref 0–0.7)
EOSINOPHIL NFR BLD AUTO: 1.7 %
ERYTHROCYTE [DISTWIDTH] IN BLOOD BY AUTOMATED COUNT: 12.3 % (ref 10–15)
GFR SERPL CREATININE-BSD FRML MDRD: >90 ML/MIN/{1.73_M2}
GLUCOSE SERPL-MCNC: 98 MG/DL (ref 70–99)
HCG SERPL QL: NEGATIVE
HCT VFR BLD AUTO: 42.5 % (ref 35–47)
HGB BLD-MCNC: 14.2 G/DL (ref 11.7–15.7)
IMM GRANULOCYTES # BLD: 0 10E9/L (ref 0–0.4)
IMM GRANULOCYTES NFR BLD: 0.3 %
LYMPHOCYTES # BLD AUTO: 2.2 10E9/L (ref 0.8–5.3)
LYMPHOCYTES NFR BLD AUTO: 30.6 %
MCH RBC QN AUTO: 30.8 PG (ref 26.5–33)
MCHC RBC AUTO-ENTMCNC: 33.4 G/DL (ref 31.5–36.5)
MCV RBC AUTO: 92 FL (ref 78–100)
MONOCYTES # BLD AUTO: 0.7 10E9/L (ref 0–1.3)
MONOCYTES NFR BLD AUTO: 10.3 %
NEUTROPHILS # BLD AUTO: 4 10E9/L (ref 1.6–8.3)
NEUTROPHILS NFR BLD AUTO: 56.4 %
NRBC # BLD AUTO: 0 10*3/UL
NRBC BLD AUTO-RTO: 0 /100
PLATELET # BLD AUTO: 219 10E9/L (ref 150–450)
POTASSIUM SERPL-SCNC: 3.9 MMOL/L (ref 3.4–5.3)
RBC # BLD AUTO: 4.61 10E12/L (ref 3.8–5.2)
SODIUM SERPL-SCNC: 140 MMOL/L (ref 133–144)
WBC # BLD AUTO: 7.1 10E9/L (ref 4–11)

## 2019-11-18 PROCEDURE — 80048 BASIC METABOLIC PNL TOTAL CA: CPT | Performed by: EMERGENCY MEDICINE

## 2019-11-18 PROCEDURE — 85025 COMPLETE CBC W/AUTO DIFF WBC: CPT | Performed by: EMERGENCY MEDICINE

## 2019-11-18 PROCEDURE — 25500064 ZZH RX 255 OP 636: Performed by: EMERGENCY MEDICINE

## 2019-11-18 PROCEDURE — 99284 EMERGENCY DEPT VISIT MOD MDM: CPT | Mod: 25

## 2019-11-18 PROCEDURE — 84703 CHORIONIC GONADOTROPIN ASSAY: CPT | Performed by: EMERGENCY MEDICINE

## 2019-11-18 PROCEDURE — 99284 EMERGENCY DEPT VISIT MOD MDM: CPT | Mod: Z6 | Performed by: EMERGENCY MEDICINE

## 2019-11-18 PROCEDURE — 70553 MRI BRAIN STEM W/O & W/DYE: CPT

## 2019-11-18 PROCEDURE — A9585 GADOBUTROL INJECTION: HCPCS | Performed by: EMERGENCY MEDICINE

## 2019-11-18 RX ORDER — GADOBUTROL 604.72 MG/ML
6 INJECTION INTRAVENOUS ONCE
Status: COMPLETED | OUTPATIENT
Start: 2019-11-18 | End: 2019-11-18

## 2019-11-18 RX ADMIN — GADOBUTROL 6 ML: 604.72 INJECTION INTRAVENOUS at 16:03

## 2019-11-18 ASSESSMENT — ENCOUNTER SYMPTOMS
NUMBNESS: 1
BACK PAIN: 0
NECK PAIN: 0
ABDOMINAL PAIN: 0
WEAKNESS: 0
SHORTNESS OF BREATH: 0
FEVER: 0

## 2019-11-18 ASSESSMENT — MIFFLIN-ST. JEOR: SCORE: 1317.78

## 2019-11-18 NOTE — DISCHARGE INSTRUCTIONS
Return if symptoms worsen or new symptoms develop.  Drink plenty of fluids rest.  If any increase in numbness any weakness shortness of breath or other symptoms present please return for further evaluation and care.  Your labs were unremarkable and there is no evidence of abnormality on MRI scan.

## 2019-11-18 NOTE — ED NOTES
Leg and hand numbness for 2 days, feels like could be something more than just the cold, feels worse in the morning. Concerned due to sister who is a year older than her was diagnosed with MS a year ago with similar sx.

## 2019-11-18 NOTE — ED PROVIDER NOTES
"  History     Chief Complaint   Patient presents with     Leg Pain     legs feel tired and toes feel like they are tingling, along with her hads, her sister was DX with MS so she is worried      HPI  Rebeca Sow is a 34 year old female who presents to the Emergency Department with concern for hand and feet numbness. Patient describes 2-3 days of numbness described as tingling in the bilateral hands and feet. The tingling has been constant over the last several days, but worse this morning after waking and while typing at work today prior to arrival. She also notes her legs feel tired and fatigued. She describes \"tightness\" in her neck, states her upper back is always tight so this is not new for her. Patient is concerned about her symptoms as her sister, similar in age, was diagnosed about 1 year ago with MS after similar symptoms. Patient also has a history of psoriasis and is concerned about possibility of another autoimmune condition. She denies vision changes, weakness in the extremities or calf pain. No recent chest pain, shortness of breath or abdominal pain. No history of lumbar back pain.     Allergies:  No Known Allergies    Problem List:    Patient Active Problem List    Diagnosis Date Noted     Headache- mixed features between migraine and tension 05/11/2017     Priority: Medium     CARDIOVASCULAR SCREENING; LDL GOAL LESS THAN 160 10/31/2010     Priority: Medium     Endometriosis of pelvic peritoneum 09/13/2006     Priority: Medium     S/p diagnostic laparoscopy 08/06 with findings of severe endometriosis, left ovarian endometrioma, significant omental and bowel, and cul de sac adhesions lysed; 6 month course of DepoLupron started 09/06       Other psoriasis 07/26/2005     Priority: Medium        Past Medical History:    Past Medical History:   Diagnosis Date     BHARATI 3 4/13/09     Endometriosis of ovary      Human papillomavirus in conditions classified elsewhere and of unspecified site      Other " "psoriasis        Past Surgical History:    Past Surgical History:   Procedure Laterality Date     LEEP TX, CERVICAL  4/13/09    BHARATI 2-3, clear margins     SURGICAL HISTORY OF -   08/06    Laparoscopic left cystectomy, lysis of adhesions, cautery of endometriosis       Family History:    Family History   Problem Relation Age of Onset     Psychotic Disorder Mother         anxiery     Multiple Sclerosis Sister      Cancer - colorectal No family hx of      Breast Cancer No family hx of      Cerebrovascular Disease No family hx of      Prostate Cancer No family hx of      C.A.D. No family hx of      Hypertension No family hx of        Social History:  Marital Status:   [2]  Social History     Tobacco Use     Smoking status: Current Some Day Smoker     Smokeless tobacco: Never Used   Substance Use Topics     Alcohol use: Yes     Comment: occ.     Drug use: No        Medications:    augmented betamethasone dipropionate (DIPROLENE) 0.05 % external lotion  calcipotriene (CALCITRENE) 0.005 % external ointment  calcipotriene (DOVONOX) 0.005 % cream  calcipotriene (DOVONOX) 0.005 % SOLN solution  clobetasol (TEMOVATE) 0.05 % cream  rizatriptan (MAXALT) 5 MG tablet        Review of Systems   Constitutional: Negative for fever.   Eyes: Negative for visual disturbance.   Respiratory: Negative for shortness of breath.    Cardiovascular: Negative for chest pain.   Gastrointestinal: Negative for abdominal pain.   Musculoskeletal: Negative for back pain and neck pain.   Neurological: Positive for numbness (tingling in bilateral hands and feet). Negative for weakness.        Sensation of fatigue in bilateral lower extremities       Physical Exam   BP: 124/72  Heart Rate: 76  Temp: 98.3  F (36.8  C)  Resp: 16  Height: 154.9 cm (5' 1\")  Weight: 68 kg (150 lb)  SpO2: 98 %      Physical Exam  Vitals signs and nursing note reviewed.   Constitutional:       General: She is not in acute distress.     Appearance: Normal appearance. She " is normal weight. She is not ill-appearing, toxic-appearing or diaphoretic.   Eyes:      Conjunctiva/sclera: Conjunctivae normal.   Psychiatric:         Mood and Affect: Mood normal.       HENT: Oral mucosa moist. No lesions.  Eyes: PERRL EOMI.   Neck: Supple  Pulmonary/Chest: Lungs are clear to auscultation bilaterally.  Cardiovascular: Heart is regular rate and rhythm. No murmur.  Abdomen: Soft, non-distended, non-tender.   Musculoskeletal: Moving all extremities well. No peripheral edema. Pulses symmetrical ; no calf tenderness.good capillary refill.  Neurological: Alert and oriented x3.subjective B slight sensation changes in hands and feet. Strength symmetrical. CN II-XII intact. DTRs symmetrical.   Skin: No rash.    ED Course        Procedures               Critical Care time:  none               Results for orders placed or performed during the hospital encounter of 11/18/19 (from the past 24 hour(s))   CBC with platelets differential   Result Value Ref Range    WBC 7.1 4.0 - 11.0 10e9/L    RBC Count 4.61 3.8 - 5.2 10e12/L    Hemoglobin 14.2 11.7 - 15.7 g/dL    Hematocrit 42.5 35.0 - 47.0 %    MCV 92 78 - 100 fl    MCH 30.8 26.5 - 33.0 pg    MCHC 33.4 31.5 - 36.5 g/dL    RDW 12.3 10.0 - 15.0 %    Platelet Count 219 150 - 450 10e9/L    Diff Method Automated Method     % Neutrophils 56.4 %    % Lymphocytes 30.6 %    % Monocytes 10.3 %    % Eosinophils 1.7 %    % Basophils 0.7 %    % Immature Granulocytes 0.3 %    Nucleated RBCs 0 0 /100    Absolute Neutrophil 4.0 1.6 - 8.3 10e9/L    Absolute Lymphocytes 2.2 0.8 - 5.3 10e9/L    Absolute Monocytes 0.7 0.0 - 1.3 10e9/L    Absolute Eosinophils 0.1 0.0 - 0.7 10e9/L    Absolute Basophils 0.1 0.0 - 0.2 10e9/L    Abs Immature Granulocytes 0.0 0 - 0.4 10e9/L    Absolute Nucleated RBC 0.0    Basic metabolic panel   Result Value Ref Range    Sodium 140 133 - 144 mmol/L    Potassium 3.9 3.4 - 5.3 mmol/L    Chloride 108 94 - 109 mmol/L    Carbon Dioxide 27 20 - 32 mmol/L     Anion Gap 5 3 - 14 mmol/L    Glucose 98 70 - 99 mg/dL    Urea Nitrogen 17 7 - 30 mg/dL    Creatinine 0.67 0.52 - 1.04 mg/dL    GFR Estimate >90 >60 mL/min/[1.73_m2]    GFR Estimate If Black >90 >60 mL/min/[1.73_m2]    Calcium 8.8 8.5 - 10.1 mg/dL   HCG qualitative Blood   Result Value Ref Range    HCG Qualitative Serum Negative NEG^Negative   MR Brain w/o & w Contrast    Narrative    MRI BRAIN WITHOUT AND WITH CONTRAST  11/18/2019 4:36 PM     HISTORY: Family history of multiple sclerosis with numbness in hands  and feet.    TECHNIQUE:  Multiplanar, multisequence MRI of the brain without and  with 6mL Gadavist     COMPARISON: None.     FINDINGS: There is no evidence of acute infarct, hemorrhage, mass, or  herniation. The brain parenchyma, ventricles and subarachnoid spaces  appear normal.     There is no abnormal intracranial enhancement.     The facial structures appear normal. The major arterial T2 flow voids  at the base of the brain appear patent.       Impression    IMPRESSION:  Normal MRI of the brain.      MIO RUVALCABA MD       Medications - No data to display    12:15 PM Patient assessed.     Assessments & Plan (with Medical Decision Making)Records were reviewed labs were obtained. Patient's symptoms have been going on for several days so a stroke code was not called. She is very concerned about having MS.  Patient's CBC and bmp were within normal limits. Pregnancy test was negative. Due to her symptoms and family history I did discuss an MRI with the patient and there was availability. This was obtained and  and was unremarkable. Findings were discussed with patient . She will return if symptoms worsen or new symptoms develop and follow up with her primary care physician later this week for a recheck.     I have reviewed the nursing notes.    I have reviewed the findings, diagnosis, plan and need for follow up with the patient.       Discharge Medication List as of 11/18/2019  5:58 PM          Final  diagnoses:   Paresthesias     This document serves as a record of the services and decisions personally performed and made by Stephan Sanders MD. It was created on his behalf by Pat Yoo, a trained medical scribe. The creation of this document is based the provider's statements to the medical scribe.  Pat Yoo 12:25 PM 11/18/2019    Provider:   The information in this document, created by the medical scribe for me, accurately reflects the services I personally performed and the decisions made by me. I have reviewed and approved this document for accuracy prior to leaving the patient care area.  Stephan Sanders MD 12:25 PM 11/18/2019 11/18/2019   [facility] EMERGENCY DEPARTMENT     Stephan Sanders MD  11/19/19 0802

## 2019-11-18 NOTE — ED AVS SNAPSHOT
[facility] Emergency Department  [address] [address]  Phone:  [phone]  Fax:  [phone]                                    Rebeca Sow   MRN: [id number]    Department:  [facility] Emergency Department   Date of Visit:  11/18/2019           After Visit Summary Signature Page    I have received my discharge instructions, and my questions have been answered. I have discussed any challenges I see with this plan with the nurse or doctor.    ..........................................................................................................................................  Patient/Patient Representative Signature      ..........................................................................................................................................  Patient Representative Print Name and Relationship to Patient    ..................................................               ................................................  Date                                   Time    ..........................................................................................................................................  Reviewed by Signature/Title    ...................................................              ..............................................  Date                                               Time          22EPIC Rev 08/18

## 2019-11-19 ENCOUNTER — TELEPHONE (OUTPATIENT)
Dept: FAMILY MEDICINE | Facility: CLINIC | Age: 34
End: 2019-11-19

## 2019-11-19 NOTE — TELEPHONE ENCOUNTER
Schedule clinic appt, this could wait until Dec it sounds like.  You could use hospital follow up or same day for sooner appt.  Thanks,  Mario Saucedo MD

## 2019-11-19 NOTE — TELEPHONE ENCOUNTER
Reason for Call: Request for an order or referral:    Order or referral being requested: Pt was seen in ER yesterday for leg pain and tingling and she wants additional lab testing ordered.  I did tell pt that she needs a clinic appt and she wants Dr. Saucedo asked if she will just order more lab tests - Lyme, mono etc.  Please call patient and advise.      Date needed: at your convenience    Has the patient been seen by the PCP for this problem? NO    Additional comments:     Phone number Patient can be reached at:  Home number on file [phone] (home)    Best Time:  any    Can we leave a detailed message on this number?  YES    Call taken on 11/19/2019 at 11:06 AM by Genoveva Perez

## 2019-11-19 NOTE — TELEPHONE ENCOUNTER
S: Patient was in the Emergency Department yesterday. And concerned about MS.     B: Sister diagnosed with MS 1 year ago.       A: Asking for testing for Mono, Lyme disease, Kashif bar. Concerned about MS or something else.   Patient reports feeling fatigued, like she just ran; concerns about her legs, legs are horrible right now with psoriasis, could it be psoriatic arthritis?       R: NO appointments until December with PCP.   Order labs? Office visit with another provider? Work in?    Provider please review and advise. Thank you.

## 2019-12-03 ENCOUNTER — OFFICE VISIT (OUTPATIENT)
Dept: FAMILY MEDICINE | Facility: CLINIC | Age: 34
End: 2019-12-03
Payer: COMMERCIAL

## 2019-12-03 VITALS
BODY MASS INDEX: 29.55 KG/M2 | HEART RATE: 77 BPM | OXYGEN SATURATION: 99 % | SYSTOLIC BLOOD PRESSURE: 112 MMHG | TEMPERATURE: 97.9 F | DIASTOLIC BLOOD PRESSURE: 68 MMHG | RESPIRATION RATE: 16 BRPM | WEIGHT: 156.4 LBS

## 2019-12-03 DIAGNOSIS — L40.8 OTHER PSORIASIS: ICD-10-CM

## 2019-12-03 DIAGNOSIS — R53.83 OTHER FATIGUE: Primary | ICD-10-CM

## 2019-12-03 DIAGNOSIS — L40.9 PSORIASIS: ICD-10-CM

## 2019-12-03 DIAGNOSIS — R20.0 BILATERAL HAND NUMBNESS: ICD-10-CM

## 2019-12-03 LAB
CRP SERPL-MCNC: <2.9 MG/L (ref 0–8)
ERYTHROCYTE [SEDIMENTATION RATE] IN BLOOD BY WESTERGREN METHOD: 8 MM/H (ref 0–20)
HETEROPH AB SER QL: NEGATIVE
TSH SERPL DL<=0.005 MIU/L-ACNC: 1.11 MU/L (ref 0.4–4)

## 2019-12-03 PROCEDURE — 86140 C-REACTIVE PROTEIN: CPT | Performed by: FAMILY MEDICINE

## 2019-12-03 PROCEDURE — 86665 EPSTEIN-BARR CAPSID VCA: CPT | Performed by: FAMILY MEDICINE

## 2019-12-03 PROCEDURE — 84443 ASSAY THYROID STIM HORMONE: CPT | Performed by: FAMILY MEDICINE

## 2019-12-03 PROCEDURE — 86618 LYME DISEASE ANTIBODY: CPT | Performed by: FAMILY MEDICINE

## 2019-12-03 PROCEDURE — 86665 EPSTEIN-BARR CAPSID VCA: CPT | Mod: 59 | Performed by: FAMILY MEDICINE

## 2019-12-03 PROCEDURE — 86038 ANTINUCLEAR ANTIBODIES: CPT | Performed by: FAMILY MEDICINE

## 2019-12-03 PROCEDURE — 36415 COLL VENOUS BLD VENIPUNCTURE: CPT | Performed by: FAMILY MEDICINE

## 2019-12-03 PROCEDURE — 86308 HETEROPHILE ANTIBODY SCREEN: CPT | Performed by: FAMILY MEDICINE

## 2019-12-03 PROCEDURE — 99214 OFFICE O/P EST MOD 30 MIN: CPT | Performed by: FAMILY MEDICINE

## 2019-12-03 PROCEDURE — 85652 RBC SED RATE AUTOMATED: CPT | Performed by: FAMILY MEDICINE

## 2019-12-03 RX ORDER — CALCIPOTRIENE 50 UG/G
CREAM TOPICAL AT BEDTIME
Qty: 100 G | Refills: 3 | Status: SHIPPED | OUTPATIENT
Start: 2019-12-03 | End: 2021-06-18

## 2019-12-03 RX ORDER — BETAMETHASONE DIPROPIONATE 0.5 MG/ML
LOTION, AUGMENTED TOPICAL DAILY
Qty: 60 ML | Refills: 6 | Status: SHIPPED | OUTPATIENT
Start: 2019-12-03 | End: 2020-04-07

## 2019-12-03 RX ORDER — CALCIPOTRIENE 0.05 MG/ML
SOLUTION TOPICAL
Qty: 60 ML | Refills: 3 | Status: SHIPPED | OUTPATIENT
Start: 2019-12-03 | End: 2020-04-07

## 2019-12-03 NOTE — PROGRESS NOTES
Subjective     Rebeca Sow is a 34 year old female who presents to clinic today for the following health issues:    HPI   MS concerns: Patient states her sister was diagnosed a year ago with MS. Patient states about 3 weeks ago she started getting similar symptoms that her sister had. Patient was very fatigued, and had numbness in hands (typing just felt different), back of legs, and feet on top and toes. Patient states after about a week patient went into the urgent care with these symptoms. Patient states she had an MRI of the brain and some lab work done. Patient states her sisters MS is in her spine. They did not do an MRI of the spine just did one on the brain. Patient states lab work came back fine. Patient states there are a couple of other labs that she would like done that was not completed when she was in urgent care. Patient would like some other labs drawn today; lyme disease tested, Mono, Kashif bar, Lupus, and psoriatic arthritis. Patient states these symptoms have subside for the most part. They are not like they were a couple weeks ago.     Has psoriasis and is getting worse. Did not have any red or hot or whole finger swelling of joints.      BP Readings from Last 3 Encounters:   12/03/19 112/68   11/18/19 116/83   05/22/19 114/78    Wt Readings from Last 3 Encounters:   12/03/19 70.9 kg (156 lb 6.4 oz)   11/18/19 68 kg (150 lb)   05/22/19 70.5 kg (155 lb 6.4 oz)                    Reviewed and updated as needed this visit by Provider         Review of Systems   ROS COMP: Constitutional, HEENT, cardiovascular, pulmonary, gi and gu systems are negative, except as otherwise noted.      Objective    /68   Pulse 77   Temp 97.9  F (36.6  C) (Tympanic)   Resp 16   Wt 70.9 kg (156 lb 6.4 oz)   LMP 11/18/2019   SpO2 99%   BMI 29.55 kg/m    Body mass index is 29.55 kg/m .  Physical Exam   GENERAL APPEARANCE: healthy, alert and no distress  MSK: no hand joint or finger swelling.  SKIN:  "psoriasis on arms and legs.  NEURO: Normal strength and tone, mentation intact, speech normal, DTR symmetrically normal in lower extremities, gait normal including heel/toe/tandem walking, cranial nerves 2-12 intact and normal strength and sensation in lower and upper extremity.    Diagnostic Test Results:  Labs reviewed in Epic    reviewed normal brain MRI and CBC and BMP.    Assessment & Plan     Rebeca was seen today for ms concerns.    Diagnoses and all orders for this visit:    Other fatigue: rule out thyroid and other infectious reasons (CBC already done and normal and normal BMP in ).  Rule out inflammation possible psoriatic arthritis although not obvious inflammatory signs.  Rule out MS of the spine  -so if normal MRI and labs then this may have been transient illness/cold or mild psoriatic arthritis.  -     Lyme Disease Qiana with reflex to WB Serum  -     Mononucleosis screen  -     EBV Capsid Antibody IgG  -     EBV Capsid Antibody IgM  -     CRP, inflammation  -     ESR: Erythrocyte sedimentation rate  -     TSH with free T4 reflex  -     Anti Nuclear Qiana IgG by IFA with Reflex    Psoriasis: refills for creams sent  -patient considering following up with derm for UV therapy.     Tobacco Cessation:   reports that she has been smoking. She has never used smokeless tobacco.        BMI:   Estimated body mass index is 29.55 kg/m  as calculated from the following:    Height as of 11/18/19: 1.549 m (5' 1\").    Weight as of this encounter: 70.9 kg (156 lb 6.4 oz).   Weight management plan: Discussed healthy diet and exercise guidelines        Patient Instructions   1. To lab    You call to schedule the [phone]    If all are normal then this may be early mild psoriatic arthritis.    Start with NSAIDs like Ibuprofen or Naproxen, but if not controlled then         No follow-ups on file.    Mario Saucedo MD  [facility]      "

## 2019-12-03 NOTE — PATIENT INSTRUCTIONS
1. To lab    You call to schedule the [phone]    If all are normal then this may be early mild psoriatic arthritis.    Start with NSAIDs like Ibuprofen or Naproxen, but if not controlled then

## 2019-12-04 ENCOUNTER — MYC MEDICAL ADVICE (OUTPATIENT)
Dept: FAMILY MEDICINE | Facility: CLINIC | Age: 34
End: 2019-12-04

## 2019-12-04 DIAGNOSIS — L40.8 OTHER PSORIASIS: Primary | ICD-10-CM

## 2019-12-04 LAB
ANA SER QL IF: NEGATIVE
B BURGDOR IGG+IGM SER QL: 0.09 (ref 0–0.89)
EBV VCA IGG SER QL IA: 4.5 AI (ref 0–0.8)
EBV VCA IGM SER QL IA: 0.5 AI (ref 0–0.8)

## 2019-12-04 NOTE — TELEPHONE ENCOUNTER
Dr Saucedo, please see her mychart note,     Eleanor is still in process.   Also, is she asking for derm referral?     Di Garcia RNC

## 2019-12-07 ENCOUNTER — HOSPITAL ENCOUNTER (OUTPATIENT)
Dept: MRI IMAGING | Facility: CLINIC | Age: 34
End: 2019-12-07
Attending: FAMILY MEDICINE
Payer: COMMERCIAL

## 2019-12-07 ENCOUNTER — HOSPITAL ENCOUNTER (OUTPATIENT)
Dept: MRI IMAGING | Facility: CLINIC | Age: 34
Discharge: HOME | End: 2019-12-07
Attending: FAMILY MEDICINE | Admitting: FAMILY MEDICINE
Payer: COMMERCIAL

## 2019-12-07 DIAGNOSIS — R20.0 BILATERAL HAND NUMBNESS: ICD-10-CM

## 2019-12-07 PROCEDURE — 25500064 ZZH RX 255 OP 636: Performed by: FAMILY MEDICINE

## 2019-12-07 PROCEDURE — 72157 MRI CHEST SPINE W/O & W/DYE: CPT

## 2019-12-07 PROCEDURE — 72156 MRI NECK SPINE W/O & W/DYE: CPT

## 2019-12-07 PROCEDURE — A9585 GADOBUTROL INJECTION: HCPCS | Performed by: FAMILY MEDICINE

## 2019-12-07 RX ORDER — GADOBUTROL 604.72 MG/ML
7 INJECTION INTRAVENOUS ONCE
Status: COMPLETED | OUTPATIENT
Start: 2019-12-07 | End: 2019-12-07

## 2019-12-07 RX ADMIN — GADOBUTROL 7 ML: 604.72 INJECTION INTRAVENOUS at 12:16

## 2020-02-16 ENCOUNTER — HEALTH MAINTENANCE LETTER (OUTPATIENT)
Age: 35
End: 2020-02-16

## 2020-04-07 ENCOUNTER — MYC REFILL (OUTPATIENT)
Dept: FAMILY MEDICINE | Facility: CLINIC | Age: 35
End: 2020-04-07

## 2020-04-07 DIAGNOSIS — L40.9 PSORIASIS: Primary | ICD-10-CM

## 2020-04-07 DIAGNOSIS — L40.9 PSORIASIS: ICD-10-CM

## 2020-04-07 RX ORDER — BETAMETHASONE DIPROPIONATE 0.5 MG/ML
LOTION, AUGMENTED TOPICAL DAILY
Qty: 60 ML | Refills: 6 | Status: SHIPPED | OUTPATIENT
Start: 2020-04-07

## 2020-04-07 RX ORDER — CALCIPOTRIENE 50 UG/G
OINTMENT TOPICAL
Qty: 120 G | Refills: 3 | Status: SHIPPED | OUTPATIENT
Start: 2020-04-07 | End: 2021-06-18

## 2020-04-07 RX ORDER — CALCIPOTRIENE 0.05 MG/ML
SOLUTION TOPICAL
Qty: 60 ML | Refills: 3 | Status: SHIPPED | OUTPATIENT
Start: 2020-04-07 | End: 2021-06-18

## 2020-04-07 RX ORDER — CLOBETASOL PROPIONATE 0.5 MG/G
CREAM TOPICAL 2 TIMES DAILY
Qty: 60 G | Refills: 1 | Status: SHIPPED | OUTPATIENT
Start: 2020-04-07 | End: 2021-06-18

## 2020-11-22 ENCOUNTER — HEALTH MAINTENANCE LETTER (OUTPATIENT)
Age: 35
End: 2020-11-22

## 2021-04-04 ENCOUNTER — HEALTH MAINTENANCE LETTER (OUTPATIENT)
Age: 36
End: 2021-04-04

## 2021-06-02 VITALS — BODY MASS INDEX: 33.23 KG/M2 | HEIGHT: 61 IN | WEIGHT: 176 LBS

## 2021-06-18 ENCOUNTER — OFFICE VISIT (OUTPATIENT)
Dept: FAMILY MEDICINE | Facility: CLINIC | Age: 36
End: 2021-06-18
Payer: COMMERCIAL

## 2021-06-18 VITALS
HEIGHT: 61 IN | SYSTOLIC BLOOD PRESSURE: 120 MMHG | HEART RATE: 91 BPM | WEIGHT: 179.4 LBS | RESPIRATION RATE: 16 BRPM | TEMPERATURE: 98.6 F | BODY MASS INDEX: 33.87 KG/M2 | OXYGEN SATURATION: 99 % | DIASTOLIC BLOOD PRESSURE: 76 MMHG

## 2021-06-18 DIAGNOSIS — Z12.4 CERVICAL CANCER SCREENING: ICD-10-CM

## 2021-06-18 DIAGNOSIS — Z00.00 ROUTINE GENERAL MEDICAL EXAMINATION AT A HEALTH CARE FACILITY: Primary | ICD-10-CM

## 2021-06-18 DIAGNOSIS — L40.9 PSORIASIS: ICD-10-CM

## 2021-06-18 PROCEDURE — 87624 HPV HI-RISK TYP POOLED RSLT: CPT | Performed by: FAMILY MEDICINE

## 2021-06-18 PROCEDURE — 99395 PREV VISIT EST AGE 18-39: CPT | Performed by: FAMILY MEDICINE

## 2021-06-18 PROCEDURE — 99214 OFFICE O/P EST MOD 30 MIN: CPT | Mod: 25 | Performed by: FAMILY MEDICINE

## 2021-06-18 PROCEDURE — G0145 SCR C/V CYTO,THINLAYER,RESCR: HCPCS | Performed by: FAMILY MEDICINE

## 2021-06-18 RX ORDER — CLOBETASOL PROPIONATE 0.5 MG/G
CREAM TOPICAL 2 TIMES DAILY
Qty: 60 G | Refills: 1 | Status: SHIPPED | OUTPATIENT
Start: 2021-06-18

## 2021-06-18 RX ORDER — CALCIPOTRIENE 50 UG/G
OINTMENT TOPICAL
Qty: 120 G | Refills: 3 | Status: SHIPPED | OUTPATIENT
Start: 2021-06-18

## 2021-06-18 RX ORDER — CALCIPOTRIENE 0.05 MG/ML
SOLUTION TOPICAL
Qty: 60 ML | Refills: 3 | Status: SHIPPED | OUTPATIENT
Start: 2021-06-18

## 2021-06-18 ASSESSMENT — MIFFLIN-ST. JEOR: SCORE: 1438.38

## 2021-06-18 NOTE — PROGRESS NOTES
SUBJECTIVE:   CC: Rebeca Sow is an 36 year old woman who presents for preventive health visit.     Patient has been advised of split billing requirements and indicates understanding: Yes  Healthy Habits:    Getting at least 3 servings of Calcium per day:  Yes (Sometimes but not every day.)    Bi-annual eye exam:  NO    Dental care twice a year:  Yes    Sleep apnea or symptoms of sleep apnea:  None    Diet:  Regular (no restrictions)    Frequency of exercise:  6-7 days/week    Duration of exercise:  30-45 minutes    Taking medications regularly:  Yes    Barriers to taking medications:  None    Medication side effects:  None    PHQ-2 Total Score:    Additional concerns today:  Yes (check ears. Periods has been irregular for about 3 month. Now they seem more regular but they are shorter then normal.)    Medication Followup of psoriasis medications.    Taking Medication as prescribed: yes    Side Effects:  None    Medication Helping Symptoms:  yes       Today's PHQ-2 Score:   PHQ-2 ( 1999 Pf[employer]) 5/22/2019   Q1: Little interest or pleasure in doing things 0   Q2: Feeling down, depressed or hopeless 0   PHQ-2 Score 0       Abuse: Current or Past (Physical, Sexual or Emotional) - No  Do you feel safe in your environment? Yes    Have you ever done Advance Care Planning? (For example, a Health Directive, POLST, or a discussion with a medical provider or your loved ones about your wishes): No, advance care planning information given to patient to review.  Patient plans to discuss their wishes with loved ones or provider.      Social History     Tobacco Use     Smoking status: Current Some Day Smoker     Smokeless tobacco: Never Used   Substance Use Topics     Alcohol use: Yes     Comment: occ.     If you drink alcohol do you typically have >3 drinks per day or >7 drinks per week? No    Alcohol Use 3/7/2017   Prescreen: >3 drinks/day or >7 drinks/week? The patient does not drink >3 drinks per day nor >7 drinks per  week.   No flowsheet data found.    Reviewed orders with patient.  Reviewed health maintenance and updated orders accordingly - Yes  BP Readings from Last 3 Encounters:   06/18/21 120/76   12/03/19 112/68   11/18/19 116/83    Wt Readings from Last 3 Encounters:   06/18/21 81.4 kg (179 lb 6.4 oz)   12/03/19 70.9 kg (156 lb 6.4 oz)   11/18/19 68 kg (150 lb)                    Breast Cancer Screening:  Any new diagnosis of family breast, ovarian, or bowel cancer? No    FSH-7: No flowsheet data found.    Patient under 40 years of age: Routine Mammogram Screening not recommended.   Pertinent mammograms are reviewed under the imaging tab.    History of abnormal Pap smear: NO - age 30-65 PAP every 5 years with negative HPV co-testing recommended  PAP / HPV Latest Ref Rng & Units 3/7/2017 1/15/2009 7/26/2005   PAP - NIL LSIL(A) NIL   HPV 16 DNA NEG Negative - -   HPV 18 DNA NEG Negative - -   OTHER HR HPV NEG Negative - -     Reviewed and updated as needed this visit by clinical staff  Tobacco  Allergies  Meds   Med Hx  Surg Hx  Fam Hx  Soc Hx        Reviewed and updated as needed this visit by Provider                    Review of Systems  CONSTITUTIONAL: NEGATIVE for fever, chills, change in weight  INTEGUMENTARU/SKIN: NEGATIVE for worrisome rashes, moles or lesions  EYES: NEGATIVE for vision changes or irritation  ENT: NEGATIVE for ear, mouth and throat problems  RESP: NEGATIVE for significant cough or SOB  BREAST: NEGATIVE for masses, tenderness or discharge  CV: NEGATIVE for chest pain, palpitations or peripheral edema  GI: NEGATIVE for nausea, abdominal pain, heartburn, or change in bowel habits  : NEGATIVE for unusual urinary or vaginal symptoms. Periods are regular, heavy bleeding lasting 3 days and every month.  MUSCULOSKELETAL: NEGATIVE for significant arthralgias or myalgia  NEURO: NEGATIVE for weakness, dizziness or paresthesias  PSYCHIATRIC: NEGATIVE for changes in mood or affect     OBJECTIVE:   BP  "120/76   Pulse 91   Temp 98.6  F (37  C) (Tympanic)   Resp 16   Ht 1.545 m (5' 0.83\")   Wt 81.4 kg (179 lb 6.4 oz)   LMP 06/04/2021 (Exact Date)   SpO2 99%   BMI 34.09 kg/m    Physical Exam  GENERAL: healthy, alert and no distress  EYES: Eyes grossly normal to inspection, PERRL and conjunctivae and sclerae normal  HENT: ear canals and TM's normal,wearing mask  NECK: no adenopathy, no asymmetry, masses, or scars and thyroid normal to palpation  RESP: lungs clear to auscultation - no rales, rhonchi or wheezes  BREAST: normal without masses, tenderness or nipple discharge and no palpable axillary masses or adenopathy  CV: regular rate and rhythm, normal S1 S2, no S3 or S4, no murmur, click or rub, no peripheral edema and peripheral pulses strong  ABDOMEN: soft, nontender, no hepatosplenomegaly, no masses and bowel sounds normal   (female): normal female external genitalia, normal urethral meatus, vaginal mucosa pink, moist, well rugated, and normal cervix without masses or discharge  MS: no gross musculoskeletal defects noted, no edema  SKIN: psoriasis on ears, scalp, no suspicious lesions or rashes  NEURO: Normal strength and tone, mentation intact and speech normal  PSYCH: mentation appears normal, affect normal/bright    Diagnostic Test Results:  Labs reviewed in Epic    ASSESSMENT/PLAN:   Rebeca was seen today for physical and recheck medication.    Diagnoses and all orders for this visit:    Routine general medical examination at a health care facility  -     Pap imaged thin layer screen with HPV - recommended age 30 - 65  -     HPV High Risk Types DNA Cervical    Psoriasis: not controlled  -     calcipotriene (DOVONOX) 0.005 % external solution; Apply to scalp at bedtime  -     clobetasol (TEMOVATE) 0.05 % external cream; Apply topically 2 times daily As needed for 2 weeks on and one week off and repeat as needed  -     calcipotriene (CALCITRENE) 0.005 % external ointment; Apply a thin layer to affected " "skin once or twice daily.    Cervical cancer screening  -     Pap imaged thin layer screen with HPV - recommended age 30 - 65  -     HPV High Risk Types DNA Cervical        Patient has been advised of split billing requirements and indicates understanding: Yes  COUNSELING:  Reviewed preventive health counseling, as reflected in patient instructions       Regular exercise       Healthy diet/nutrition       Vision screening       Consider Hep C screening for all patients one time for ages 18-79 years       HIV screeninx in teen years, 1x in adult years, and at intervals if high risk    Estimated body mass index is 34.09 kg/m  as calculated from the following:    Height as of this encounter: 1.545 m (5' 0.83\").    Weight as of this encounter: 81.4 kg (179 lb 6.4 oz).    Weight management plan: Discussed healthy diet and exercise guidelines    She reports that she has been smoking. She has never used smokeless tobacco.  Tobacco Cessation Action Plan:   Self help information given to patient      Counseling Resources:  ATP IV Guidelines  Pooled Cohorts Equation Calculator  Breast Cancer Risk Calculator  BRCA-Related Cancer Risk Assessment: FHS-7 Tool  FRAX Risk Assessment  ICSI Preventive Guidelines  Dietary Guidelines for Americans, 2010  USDA's MyPlate  ASA Prophylaxis  Lung CA Screening    Mario Saucedo MD  [facility]  "

## 2021-06-18 NOTE — PATIENT INSTRUCTIONS
Preventive Health Recommendations  Female Ages 26 - 39  Yearly exam:   See your health care provider every year in order to    Review health changes.     Discuss preventive care.      Review your medicines if you your doctor has prescribed any.    Until age 30: Get a Pap test every three years (more often if you have had an abnormal result).    After age 30: Talk to your doctor about whether you should have a Pap test every 3 years or have a Pap test with HPV screening every 5 years.   You do not need a Pap test if your uterus was removed (hysterectomy) and you have not had cancer.  You should be tested each year for STDs (sexually transmitted diseases), if you're at risk.   Talk to your provider about how often to have your cholesterol checked.  If you are at risk for diabetes, you should have a diabetes test (fasting glucose).  Shots: Get a flu shot each year. Get a tetanus shot every 10 years.   Nutrition:     Eat at least 5 servings of fruits and vegetables each day.    Eat whole-grain bread, whole-wheat pasta and brown rice instead of white grains and rice.    Get adequate Calcium and Vitamin D.     Lifestyle    Exercise at least 150 minutes a week (30 minutes a day, 5 days of the week). This will help you control your weight and prevent disease.    Limit alcohol to one drink per day.    No smoking.     Wear sunscreen to prevent skin cancer.    See your dentist every six months for an exam and cleaning.    This is a preventative visit and any additional concerns or chronic disease management including medication refills addressed today could be charged additionally.    Preventative visits screen for diseases prior to they occur.  They do not cover for any new diagnosis or chronic disease management.     If you have questions regarding your coverage please check with your insurance provider.  At [facility] we need to code correctly to be in compliance with all insurance companies.    Patient Education      MyPlate Worksheet: 1,600 Calories    Your calorie needs are about 1,600 calories a day. Below are the USDA guidelines for your daily recommended amount of each food group.   Vegetables 2 cups Fruits 1  cups Grains 5 ounces Dairy 3 cups Protein 5 ounces   Eat a variety of vegetables each day.  Aim for these amounts each week:    1  cups dark green vegetables    4 cups red or orange-colored vegetables    1 cup dry beans and peas    4 cups starchy vegetables    3  cups other vegetables Eat a variety of fruits each day.  Go easy on fruit juices.  Good choices of fruits include:    Berries    Bananas    Apples    Melon    Dried fruit    Frozen fruit    Canned fruit Choose whole grains whenever you can.  Aim to eat at least 2  ounces of whole grains each day:    Bread    Cereal    Rice    Pasta    Potatoes    Tortillas Choose low-fat or fat-free milk, yogurt, or cheese each day.  Good choices include:    Low-fat or fat-free milk or chocolate milk    Low-fat or fat-free yogurt    Low-fat or fat-free cottage cheese or other reduced-fat cheeses    Calcium-fortified milk alternatives Choose low-fat or lean meats, poultry, fish, and seafood each day.   Vary your protein. Choose more:    Fish and other seafood    Lean low-fat meat and poultry    Eggs    Beans, peas    Tofu    Unsalted nuts and seeds  Choose less high-fat and red meat.   Source: USDA MyPlate, www.choosemyplate.gov  Know your limits on sodium, saturated fat, and added sugars     Your allowance for saturated fat is 18 grams a day.    Limit the added sugars to 40 grams a day.    Cut back on salt (sodium). Stay under 2,300 mg sodium a day. If you have a health condition such as heart disease or high blood pressure, your doctor will likely tell you to limit sodium to no more than 1,500 mg a day.    Get moving and be active!  Aim for at least 30 minutes of physical activity most days of the week or 150 minutes of moderate exercise a week.   MyPlate servings  worksheet: 1,600 calories  This worksheet tells you how many servings you should get each day from each food group, and tells you how much food makes a serving. Use this as a guide as you plan your meals throughout the day. Track your progress daily by writing in what you actually ate.   Food Group  Daily MyPlate Goal What You Ate Today   Vegetables 4 half-cups or 4 servings  One serving is:    cup cut-up raw or cooked vegetables  1 cup raw, leafy vegetables    baked sweet potato    cup vegetable juice  Note: At meals, fill half your plate with vegetables and fruit and eat them first.      Fruits 3 half-cups or 3 servings  One serving is:    cup fresh, frozen, or canned fruit  1 medium piece of fruit  1 cup of berries or melon    cup dried fruit    cup 100% fruit juice  Note: Make most choices fruit instead of juice.      Grains 5 servings or 5 ounces  One serving is:  1 slice bread  1 cup dry cereal    cup cooked rice, pasta, or cereal  1 5-inch tortilla  Note: Choose whole grains for at least half of your servings each day.      Dairy 3 servings or 3 cups  One serving is:  1 cup milk  1  ounces reduced-fat hard cheese  2 ounces processed cheese  1 cup low-fat yogurt  1/3 cup shredded cheese  Note: Choose low-fat or fat-free most often.      Protein 5 servings or 5 ounces  One serving is:  1 ounce cooked lean beef, pork, lamb, or ham  1 ounce cooked chicken or turkey (no skin)  1 ounce cooked fish or shellfish (not fried)  1 egg    cup egg substitute    ounce nuts or seeds  1 tablespoon peanut or almond butter    cup cooked dry beans or peas    cup tofu  2 tablespoons hummus      [employer] last reviewed this educational content on 6/1/2020 2000-2021 [employer]. All rights reserved. This information is not intended as a substitute for professional medical care. Always follow your healthcare professional's instructions.

## 2021-06-21 NOTE — ANESTHESIA POSTPROCEDURE EVALUATION
Patient: Rebeca Sow  DIAGNOSTIC LAPAROSCOPY,  LYSIS OF ADHESION, TUBAL DYE STUDY, HYSTEROSCOPY DILATION AND CURETTAGE  Anesthesia type: general    Patient location: PACU  Last vitals:   Vitals:    10/24/18 1300   BP: 122/74   Pulse: 65   Resp:    Temp:    SpO2: 99%     Post vital signs: stable  Level of consciousness: awake and responds to simple questions  Post-anesthesia pain: pain controlled  Post-anesthesia nausea and vomiting: no  Pulmonary: unassisted, return to baseline  Cardiovascular: stable and blood pressure at baseline  Hydration: adequate  Anesthetic events: no    QCDR Measures:  ASA# 11 - Glendy-op Cardiac Arrest: ASA11B - Patient did NOT experience unanticipated cardiac arrest  ASA# 12 - Glendy-op Mortality Rate: ASA12B - Patient did NOT die  ASA# 13 - PACU Re-Intubation Rate: ASA13B - Patient did NOT require a new airway mgmt  ASA# 10 - Composite Anes Safety: ASA10A - No serious adverse event    Additional Notes:

## 2021-06-21 NOTE — ANESTHESIA PREPROCEDURE EVALUATION
Anesthesia Evaluation      Patient summary reviewed   No history of anesthetic complications     Airway   Mallampati: III  Neck ROM: full   Pulmonary - negative ROS and normal exam   Sleep apnea: denies.                         Cardiovascular - negative ROS  Exercise tolerance: > or = 4 METS  Rhythm: regular        Neuro/Psych - negative ROS     Endo/Other    (+) obesity (BMI 33),   (-) not pregnant     GI/Hepatic/Renal - negative ROS      Other findings:     NPO 8 PM          Dental - normal exam                        Anesthesia Plan  Planned anesthetic: general endotracheal      Propofol gtt with minimal gas  Scopolamine patch, zofran, decadron        ASA 2   Induction: intravenous   Anesthetic plan and risks discussed with: patient, spouse and parent/guardian  Anesthesia plan special considerations: antiemetics,   Post-op plan: routine recovery        Yael Maya MD  Staff Anesthesiologist  [facility]  10/24/18

## 2021-06-21 NOTE — ANESTHESIA CARE TRANSFER NOTE
Last vitals:   Vitals:    10/24/18 1002   BP: 109/68   Pulse: 74   Resp: 16   Temp: 36.8  C (98.2  F)   SpO2: 98%     Patient's level of consciousness is drowsy, denies pain, denies nausea.   Spontaneous respirations: yes  Maintains airway independently: yes  Dentition unchanged: yes  Oropharynx: oropharynx clear of all foreign objects    QCDR Measures:  ASA# 20 - Surgical Safety Checklist: WHO surgical safety checklist completed prior to induction  PQRS# 430 - Adult PONV Prevention: 4558F - Pt received => 2 anti-emetic agents (different classes) preop & intraop  ASA# 8 - Peds PONV Prevention: NA - Not pediatric patient, not GA or 2 or more risk factors NOT present  PQRS# 424 - Glendy-op Temp Management: 4559F - At least one body temp DOCUMENTED => 35.5C or 95.9F within required timeframe  PQRS# 426 - PACU Transfer Protocol: - Transfer of care checklist used  ASA# 14 - Acute Post-op Pain: ASA14B - Patient did NOT experience pain >= 7 out of 10

## 2021-06-22 LAB
COPATH REPORT: NORMAL
PAP: NORMAL

## 2021-06-24 ENCOUNTER — PATIENT OUTREACH (OUTPATIENT)
Dept: FAMILY MEDICINE | Facility: CLINIC | Age: 36
End: 2021-06-24

## 2021-06-24 LAB
FINAL DIAGNOSIS: NORMAL
HPV HR 12 DNA CVX QL NAA+PROBE: NEGATIVE
HPV16 DNA SPEC QL NAA+PROBE: NEGATIVE
HPV18 DNA SPEC QL NAA+PROBE: NEGATIVE
SPECIMEN DESCRIPTION: NORMAL
SPECIMEN SOURCE CVX/VAG CYTO: NORMAL

## 2021-06-28 ENCOUNTER — OFFICE VISIT (OUTPATIENT)
Dept: FAMILY MEDICINE | Facility: CLINIC | Age: 36
End: 2021-06-28
Payer: COMMERCIAL

## 2021-06-28 VITALS
WEIGHT: 177 LBS | HEIGHT: 61 IN | DIASTOLIC BLOOD PRESSURE: 70 MMHG | HEART RATE: 99 BPM | BODY MASS INDEX: 33.42 KG/M2 | TEMPERATURE: 99.4 F | OXYGEN SATURATION: 100 % | SYSTOLIC BLOOD PRESSURE: 124 MMHG

## 2021-06-28 DIAGNOSIS — T14.8XXA WOUND INFECTION: Primary | ICD-10-CM

## 2021-06-28 DIAGNOSIS — L08.9 WOUND INFECTION: Primary | ICD-10-CM

## 2021-06-28 PROCEDURE — 99213 OFFICE O/P EST LOW 20 MIN: CPT | Performed by: NURSE PRACTITIONER

## 2021-06-28 RX ORDER — CEPHALEXIN 500 MG/1
500 CAPSULE ORAL 2 TIMES DAILY
Qty: 10 CAPSULE | Refills: 0 | Status: SHIPPED | OUTPATIENT
Start: 2021-06-28 | End: 2021-07-03

## 2021-06-28 ASSESSMENT — MIFFLIN-ST. JEOR: SCORE: 1427.55

## 2021-06-28 NOTE — PATIENT INSTRUCTIONS
"Take keflex 3 times daily with food. Take a probiotic such as Culturelle or Florastor (recommend lactobacillus 50 billion CFU twice daily  by at least one hour from the antibiotic) while on the antibiotic or eat a Greek yogurt containing \"live active cultures\" daily.    Monitor, let me know if there is further pus, fevers.    "

## 2021-06-28 NOTE — PROGRESS NOTES
"    Assessment & Plan     Wound infection  TDap current. Start keflex. Let me know if not improving.  - cephALEXin (KEFLEX) 500 MG capsule; Take 1 capsule (500 mg) by mouth 2 times daily for 5 days      Patient Instructions   Take keflex 3 times daily with food. Take a probiotic such as Culturelle or Florastor (recommend lactobacillus 50 billion CFU twice daily  by at least one hour from the antibiotic) while on the antibiotic or eat a Greek yogurt containing \"live active cultures\" daily.    Monitor, let me know if there is further pus, fevers.        Return in about 1 week (around 7/5/2021) for worsening or continued symptoms.    ALFA Rose CNP  [facility]    Eusebio Jose is a 36 year old who presents for the following health issues     HPI     Skin cut   Onset/Duration: 1 week, cut on a metal stake   Description  Location: right shin   Bleeding:  No  Feels warm to the touch   Draining at times   Intensity:  moderate  Progression of Symptoms:  improving  Accompanying signs and symptoms:   Bleeding: no  Scaling: YES          Therapies tried and outcome: cleaning, using butterfly bandages to keep it closed   Above HPI reviewed. Additionally, did have purulent drainage last night. Seems somewhat better today. No measured fever at home.           Review of Systems   Constitutional, HEENT, cardiovascular, pulmonary, gi and gu systems are negative, except as otherwise noted.      Objective    /70   Pulse 99   Temp 99.4  F (37.4  C) (Tympanic)   Ht 1.545 m (5' 0.83\")   Wt 80.3 kg (177 lb)   LMP 06/04/2021 (Exact Date)   SpO2 100%   BMI 33.63 kg/m    Body mass index is 33.63 kg/m .  Physical Exam  Vitals signs and nursing note reviewed.   Constitutional:       General: She is not in acute distress.     Appearance: Normal appearance.   HENT:      Head: Normocephalic and atraumatic.      Mouth/Throat:      Mouth: Mucous membranes are moist.   Neck:      " Musculoskeletal: Neck supple.   Cardiovascular:      Rate and Rhythm: Normal rate.   Pulmonary:      Effort: Pulmonary effort is normal.   Skin:     General: Skin is warm and dry.      Comments: 4cm wound to right anterior tib/fib area. Mild surrounding erythema. No fluctuance.   Neurological:      General: No focal deficit present.      Mental Status: She is alert.   Psychiatric:         Mood and Affect: Mood normal.         Behavior: Behavior normal.

## 2021-09-18 ENCOUNTER — HEALTH MAINTENANCE LETTER (OUTPATIENT)
Age: 36
End: 2021-09-18

## 2022-05-26 ENCOUNTER — PATIENT OUTREACH (OUTPATIENT)
Dept: FAMILY MEDICINE | Facility: CLINIC | Age: 37
End: 2022-05-26
Payer: COMMERCIAL

## 2022-05-26 DIAGNOSIS — D06.9 SEVERE DYSPLASIA OF CERVIX (CIN III): ICD-10-CM

## 2022-05-26 NOTE — LETTER
May 26, 2022      Rebeca Sow  [address]  [place] [address]        Dear ,    This letter is to remind you that you are due for your follow-up Pap smear and Human Papillomavirus (HPV) test.    Please call [phone] to schedule your appointment at your earliest convenience.    If you have completed the appointment outside of the [facility] system, please have the records forwarded to our office. We will update your chart for your provider to review before your next annual wellness visit.     Thank you for choosing [facility]!      Sincerely,    Your [facility] Care Team

## 2022-07-27 NOTE — TELEPHONE ENCOUNTER
FYI to provider - Patient is lost to pap tracking follow-up. Attempts to contact pt have been made per reminder process and there has been no reply and/or no appt scheduled. Contact hx listed below.     1/15/09 LSIL. Recommend colposcopy  2/4/09 Colpo BHARATI 2-3. Plan: LEEP.  4/13/09 LEEP BHARATI 2-3, margins clear; repeat Pap Q 6 months x 2 years  11/10 Lost to follow-up for pap tracking   Gap in care  3/7/17 NIL pap, neg HPV.  6/18/21 NIL pap, neg HPV. Plan: 1 year cotest.  5/26/22 Reminder mychart/letter  6/27/22 Reminder call -- Pt notified   7/27/22 Lost to follow-up for pap tracking     Kami Paul RN BSN, Pap Tracking

## 2022-08-20 ENCOUNTER — HEALTH MAINTENANCE LETTER (OUTPATIENT)
Age: 37
End: 2022-08-20

## 2022-11-18 ENCOUNTER — LAB (OUTPATIENT)
Dept: LAB | Facility: CLINIC | Age: 37
End: 2022-11-18
Payer: COMMERCIAL

## 2022-11-18 ENCOUNTER — DOCUMENTATION ONLY (OUTPATIENT)
Dept: LAB | Facility: CLINIC | Age: 37
End: 2022-11-18

## 2022-11-18 DIAGNOSIS — L40.9 PSORIASIS: Primary | ICD-10-CM

## 2022-11-18 PROCEDURE — 86481 TB AG RESPONSE T-CELL SUSP: CPT

## 2022-11-18 PROCEDURE — 36415 COLL VENOUS BLD VENIPUNCTURE: CPT

## 2022-11-20 ENCOUNTER — HEALTH MAINTENANCE LETTER (OUTPATIENT)
Age: 37
End: 2022-11-20

## 2022-11-21 LAB
GAMMA INTERFERON BACKGROUND BLD IA-ACNC: 0.03 IU/ML
M TB IFN-G BLD-IMP: NEGATIVE
M TB IFN-G CD4+ BCKGRND COR BLD-ACNC: 9.97 IU/ML
MITOGEN IGNF BCKGRD COR BLD-ACNC: 0 IU/ML
MITOGEN IGNF BCKGRD COR BLD-ACNC: 0.01 IU/ML
QUANTIFERON MITOGEN: 10 IU/ML
QUANTIFERON NIL TUBE: 0.03 IU/ML
QUANTIFERON TB1 TUBE: 0.03 IU/ML
QUANTIFERON TB2 TUBE: 0.04

## 2022-12-19 ENCOUNTER — TRANSFERRED RECORDS (OUTPATIENT)
Dept: HEALTH INFORMATION MANAGEMENT | Facility: CLINIC | Age: 37
End: 2022-12-19

## 2022-12-19 ENCOUNTER — LAB REQUISITION (OUTPATIENT)
Dept: LAB | Facility: CLINIC | Age: 37
End: 2022-12-19

## 2022-12-19 DIAGNOSIS — Z01.419 ENCOUNTER FOR GYNECOLOGICAL EXAMINATION (GENERAL) (ROUTINE) WITHOUT ABNORMAL FINDINGS: ICD-10-CM

## 2022-12-19 PROCEDURE — G0145 SCR C/V CYTO,THINLAYER,RESCR: HCPCS | Performed by: STUDENT IN AN ORGANIZED HEALTH CARE EDUCATION/TRAINING PROGRAM

## 2022-12-19 PROCEDURE — 87624 HPV HI-RISK TYP POOLED RSLT: CPT | Performed by: STUDENT IN AN ORGANIZED HEALTH CARE EDUCATION/TRAINING PROGRAM

## 2022-12-21 LAB
BKR LAB AP GYN ADEQUACY: NORMAL
BKR LAB AP GYN INTERPRETATION: NORMAL
BKR LAB AP HPV REFLEX: NORMAL
BKR LAB AP LMP: NORMAL
BKR LAB AP PREVIOUS ABNL DX: NORMAL
BKR LAB AP PREVIOUS ABNORMAL: NORMAL
PATH REPORT.COMMENTS IMP SPEC: NORMAL
PATH REPORT.COMMENTS IMP SPEC: NORMAL
PATH REPORT.RELEVANT HX SPEC: NORMAL

## 2022-12-23 LAB
HUMAN PAPILLOMA VIRUS 16 DNA: NEGATIVE
HUMAN PAPILLOMA VIRUS 18 DNA: NEGATIVE
HUMAN PAPILLOMA VIRUS FINAL DIAGNOSIS: NORMAL
HUMAN PAPILLOMA VIRUS OTHER HR: NEGATIVE

## 2022-12-29 ENCOUNTER — HOSPITAL ENCOUNTER (OUTPATIENT)
Facility: CLINIC | Age: 37
Discharge: HOME | End: 2022-12-29
Admitting: STUDENT IN AN ORGANIZED HEALTH CARE EDUCATION/TRAINING PROGRAM
Payer: COMMERCIAL

## 2022-12-29 ENCOUNTER — LAB REQUISITION (OUTPATIENT)
Dept: LAB | Facility: CLINIC | Age: 37
End: 2022-12-29

## 2022-12-29 DIAGNOSIS — N97.9 FEMALE INFERTILITY, UNSPECIFIED: ICD-10-CM

## 2022-12-29 LAB
ESTRADIOL SERPL-MCNC: 34 PG/ML
FSH SERPL IRP2-ACNC: 8.4 MIU/ML
HBA1C MFR BLD: 5.8 %
LH SERPL-ACNC: 4.9 MIU/ML
MIS SERPL-MCNC: 3.1 NG/ML (ref 0.15–7.5)
TSH SERPL DL<=0.005 MIU/L-ACNC: 1.31 UIU/ML (ref 0.3–4.2)

## 2022-12-29 PROCEDURE — 83520 IMMUNOASSAY QUANT NOS NONAB: CPT | Performed by: STUDENT IN AN ORGANIZED HEALTH CARE EDUCATION/TRAINING PROGRAM

## 2022-12-29 PROCEDURE — 83001 ASSAY OF GONADOTROPIN (FSH): CPT | Performed by: STUDENT IN AN ORGANIZED HEALTH CARE EDUCATION/TRAINING PROGRAM

## 2022-12-29 PROCEDURE — 83002 ASSAY OF GONADOTROPIN (LH): CPT | Performed by: STUDENT IN AN ORGANIZED HEALTH CARE EDUCATION/TRAINING PROGRAM

## 2022-12-29 PROCEDURE — 84443 ASSAY THYROID STIM HORMONE: CPT | Performed by: STUDENT IN AN ORGANIZED HEALTH CARE EDUCATION/TRAINING PROGRAM

## 2022-12-29 PROCEDURE — 82670 ASSAY OF TOTAL ESTRADIOL: CPT | Performed by: STUDENT IN AN ORGANIZED HEALTH CARE EDUCATION/TRAINING PROGRAM

## 2022-12-29 PROCEDURE — 83036 HEMOGLOBIN GLYCOSYLATED A1C: CPT | Performed by: STUDENT IN AN ORGANIZED HEALTH CARE EDUCATION/TRAINING PROGRAM

## 2023-09-16 ENCOUNTER — HEALTH MAINTENANCE LETTER (OUTPATIENT)
Age: 38
End: 2023-09-16

## 2024-11-09 ENCOUNTER — HEALTH MAINTENANCE LETTER (OUTPATIENT)
Age: 39
End: 2024-11-09

## 2025-03-02 ENCOUNTER — HEALTH MAINTENANCE LETTER (OUTPATIENT)
Age: 40
End: 2025-03-02